# Patient Record
Sex: FEMALE | Race: WHITE
[De-identification: names, ages, dates, MRNs, and addresses within clinical notes are randomized per-mention and may not be internally consistent; named-entity substitution may affect disease eponyms.]

---

## 2017-02-22 ENCOUNTER — HOSPITAL ENCOUNTER (EMERGENCY)
Dept: HOSPITAL 25 - ED | Age: 27
Discharge: LEFT BEFORE DISCHARGE | End: 2017-02-22
Payer: SELF-PAY

## 2017-02-22 VITALS — SYSTOLIC BLOOD PRESSURE: 129 MMHG | DIASTOLIC BLOOD PRESSURE: 61 MMHG

## 2017-02-22 DIAGNOSIS — Z53.21: ICD-10-CM

## 2017-02-22 DIAGNOSIS — R00.2: Primary | ICD-10-CM

## 2017-02-22 PROCEDURE — 93005 ELECTROCARDIOGRAM TRACING: CPT

## 2017-06-24 ENCOUNTER — HOSPITAL ENCOUNTER (EMERGENCY)
Dept: HOSPITAL 25 - UCEAST | Age: 27
Discharge: HOME | End: 2017-06-24
Payer: COMMERCIAL

## 2017-06-24 VITALS — DIASTOLIC BLOOD PRESSURE: 66 MMHG | SYSTOLIC BLOOD PRESSURE: 110 MMHG

## 2017-06-24 DIAGNOSIS — F12.90: ICD-10-CM

## 2017-06-24 DIAGNOSIS — K29.70: Primary | ICD-10-CM

## 2017-06-24 DIAGNOSIS — Z87.891: ICD-10-CM

## 2017-06-24 PROCEDURE — 99212 OFFICE O/P EST SF 10 MIN: CPT

## 2017-06-24 PROCEDURE — G0463 HOSPITAL OUTPT CLINIC VISIT: HCPCS

## 2017-06-24 NOTE — UC
Abdominal Pain Female HPI





- HPI Summary


HPI Summary: 


PT HAS HAD ABDOMINAL PAIN AND INTERMITTENT N/V/D FOR YEARS. USED TO TAKE 

PRILOSEC WHICH HELPED BUT RAN OUT A MONTH AGO. PAIN HAS BEEN CONSTANT FOR THE 

PAST WEEK. PT IS TEARFUL. WENT TO GI SEVERAL YEARS AGO AND NOTHING WAS FOUND. 

NO FEVER.





- History of Current Complaint


Chief Complaint: UCAbdominalPain


Stated Complaint: ABDOMINAL PAIN


Time Seen by Provider: 06/24/17 18:48


Hx Obtained From: Patient


Hx Last Menstrual Period: 6/13/17


Onset/Duration: Gradual Onset, Lasting Weeks, Still Present


Timing: Constant


Severity Initially: Moderate


Severity Currently: Moderate


Pain Intensity: 7


Pain Scale Used: 0-10 Numeric


Location: Diffuse


Radiates: No


Character: Burning


Aggravating Factor(s): Food


Alleviating Factor(s): Nothing


Associated Signs and Symptoms: Positive: Nausea, Vomiting, Diarrhea


Allergies/Adverse Reactions: 


 Allergies











Allergy/AdvReac Type Severity Reaction Status Date / Time


 


No Known Allergies Allergy   Verified 06/24/17 18:21











Home Medications: 


 Home Medications





Calcium Carbonate CHEW TAB* [Tums*]  PRN 06/24/17 [History]











PMH/Surg Hx/FS Hx/Imm Hx





- Additional Past Medical History


Additional PMH: 


EATING DISORDER - RECOVERED 





- Surgical History


Surgical History: Yes


Surgery Procedure, Year, and Place: wisdom teeth





- Family History


Known Family History: Positive: Hypertension





- Social History


Alcohol Use: Rare


Substance Use Type: Marijuana


Substance Use Comment - Amount & Last Used: regularly


Smoking Status (MU): Former Smoker


Amount Used/How Often: 3/4 ppd


Length of Time of Smoking/Using Tobacco: 10 years


Have You Smoked in the Last Year: Yes


Household Exposure Type: Cigarettes





Review of Systems


Constitutional: Negative


Respiratory: Negative


Cardiovascular: Negative


Gastrointestinal: Abdominal Pain, Vomiting, Diarrhea, Nausea


All Other Systems Reviewed And Are Negative: Yes





Physical Exam


Triage Information Reviewed: Yes


Appearance: Well-Appearing, No Pain Distress, Well-Nourished, Other: - PT 

TEARFUL


Vital Signs: 


 Initial Vital Signs











Temp  97.7 F   06/24/17 18:16


 


Pulse  79   06/24/17 18:16


 


Resp  16   06/24/17 18:16


 


BP  110/66   06/24/17 18:16


 


Pulse Ox  100   06/24/17 18:16











Vital Signs Reviewed: Yes


Eyes: Positive: Conjunctiva Clear


ENT: Positive: Hearing grossly normal


Neck: Positive: Supple


Respiratory Exam: Normal


Cardiovascular Exam: Normal


Abdomen Description: Positive: Soft, Other: - DIFFUSELY TENDER. NO REBOUND OR 

RIGIDITY.  Negative: CVA Tenderness (R), CVA Tenderness (L), Distended, Guarding


Bowel Sounds: Positive: Present


Musculoskeletal: Positive: No Edema


Neurological: Positive: Alert


Psychological: Positive: Age Appropriate Behavior


Skin: Negative: rashes





Re-Evaluation





- Re-Evaluation


  ** First Eval


Re-Evaluation Time: 19:50 - PT FEELS BETTER AFTE GI COCKTAIL AND OMEPRAZOLE


Change: Improved





Abd Pain Female Course/Dx





- Course


Course Of Treatment: SX MAY BE DUE TO IBS VS. GASTRITIS VS. REFLUX VS. ANXIETY. 

REFERRED TO GI FOR FURTHER EVALUATION. PPI DAILY. WATCH DIET.





- Differential Dx/Diagnosis


Provider Diagnoses: GASTRITIS





Discharge





- Discharge Plan


Condition: Stable


Disposition: HOME


Prescriptions: 


Omeprazole 40 mg PO DAILY #30 cap


Patient Education Materials:  Gastritis (ED), Abdominal Pain (ED)


Forms:  *Work Release


Referrals: 


Bill Nation, NP [Primary Care Provider] - If Needed


Additional Instructions: 


TAKE THE REFLUX MEDICINE IN THE MORNING (IDEALLY AT LEAST 30 MINUTES BEFORE YOU 

EAT). EAT SLOWLY. STAY UPRIGHT AT LEAST 30 MINUTES AFTER EATING. EAT SMALLER, 

MORE FREQUENT MEALS AS OPPOSED TO LARGE INFREQUENT MEALS. AVOID POSSIBLE 

TRIGGER FOODS - GREASY, SPICY, ACIDIC FOODS. CAFFEINE, ALCOHOL.





FOLLOW-UP WITH GI





[facility]


Address: [address] [place] [address]


[phone]





CALL THE NUMBER BELOW FOR ASSISTANCE IN ESTABLISHING WITH A PCP


An additional resource available to assist in finding the appropriate physician 

for your health care needs is [facility] ([facility]).  

You may contact them by calling [phone].

## 2018-06-02 ENCOUNTER — HOSPITAL ENCOUNTER (EMERGENCY)
Dept: HOSPITAL 25 - ED | Age: 28
Discharge: HOME | End: 2018-06-02
Payer: COMMERCIAL

## 2018-06-02 VITALS — DIASTOLIC BLOOD PRESSURE: 65 MMHG | SYSTOLIC BLOOD PRESSURE: 120 MMHG

## 2018-06-02 DIAGNOSIS — Z87.891: ICD-10-CM

## 2018-06-02 DIAGNOSIS — N83.201: Primary | ICD-10-CM

## 2018-06-02 LAB
BASOPHILS # BLD AUTO: 0.1 10^3/UL (ref 0–0.2)
EOSINOPHIL # BLD AUTO: 0 10^3/UL (ref 0–0.6)
HCT VFR BLD AUTO: 40 % (ref 35–47)
HGB BLD-MCNC: 13.7 G/DL (ref 12–16)
LYMPHOCYTES # BLD AUTO: 3.7 10^3/UL (ref 1–4.8)
MCH RBC QN AUTO: 29 PG (ref 27–31)
MCHC RBC AUTO-ENTMCNC: 34 G/DL (ref 31–36)
MCV RBC AUTO: 85 FL (ref 80–97)
MONOCYTES # BLD AUTO: 0.5 10^3/UL (ref 0–0.8)
NEUTROPHILS # BLD AUTO: 3.9 10^3/UL (ref 1.5–7.7)
NRBC # BLD AUTO: 0 10^3/UL
NRBC BLD QL AUTO: 0.1
PLATELET # BLD AUTO: 231 10^3/UL (ref 150–450)
RBC # BLD AUTO: 4.74 10^6/UL (ref 4–5.4)
WBC # BLD AUTO: 8.3 10^3/UL (ref 3.5–10.8)

## 2018-06-02 PROCEDURE — 99284 EMERGENCY DEPT VISIT MOD MDM: CPT

## 2018-06-02 PROCEDURE — 96376 TX/PRO/DX INJ SAME DRUG ADON: CPT

## 2018-06-02 PROCEDURE — 87491 CHLMYD TRACH DNA AMP PROBE: CPT

## 2018-06-02 PROCEDURE — 87480 CANDIDA DNA DIR PROBE: CPT

## 2018-06-02 PROCEDURE — 81015 MICROSCOPIC EXAM OF URINE: CPT

## 2018-06-02 PROCEDURE — 83605 ASSAY OF LACTIC ACID: CPT

## 2018-06-02 PROCEDURE — 74177 CT ABD & PELVIS W/CONTRAST: CPT

## 2018-06-02 PROCEDURE — 36415 COLL VENOUS BLD VENIPUNCTURE: CPT

## 2018-06-02 PROCEDURE — 83690 ASSAY OF LIPASE: CPT

## 2018-06-02 PROCEDURE — 96372 THER/PROPH/DIAG INJ SC/IM: CPT

## 2018-06-02 PROCEDURE — 87591 N.GONORRHOEAE DNA AMP PROB: CPT

## 2018-06-02 PROCEDURE — 87086 URINE CULTURE/COLONY COUNT: CPT

## 2018-06-02 PROCEDURE — 81003 URINALYSIS AUTO W/O SCOPE: CPT

## 2018-06-02 PROCEDURE — 87510 GARDNER VAG DNA DIR PROBE: CPT

## 2018-06-02 PROCEDURE — 87661 TRICHOMONAS VAGINALIS AMPLIF: CPT

## 2018-06-02 PROCEDURE — 86140 C-REACTIVE PROTEIN: CPT

## 2018-06-02 PROCEDURE — 84702 CHORIONIC GONADOTROPIN TEST: CPT

## 2018-06-02 PROCEDURE — 96361 HYDRATE IV INFUSION ADD-ON: CPT

## 2018-06-02 PROCEDURE — 96374 THER/PROPH/DIAG INJ IV PUSH: CPT

## 2018-06-02 PROCEDURE — 80053 COMPREHEN METABOLIC PANEL: CPT

## 2018-06-02 PROCEDURE — 85025 COMPLETE CBC W/AUTO DIFF WBC: CPT

## 2018-06-02 NOTE — RAD
INDICATION: RIGHT lower quadrant pain, nausea, vomiting.



COMPARISON: August 25, 2016



TECHNIQUE: Multidetector CT images were obtained from the lung bases to the ischial

tuberosities with 81 mL Omnipaque 300 IV and oral contrast.  Multiplanar reformation.



REPORT: Minimal dependent atelectasis at the lung bases.



Relative hypodensity of the liver likely reflects early stage of contrast enhancement.

Suggestion of variant Riedel lobe accounting for mild cephalocaudal prominence of the

liver without change. No suspicious abnormality of the liver. No CT abnormality of the

gallbladder, pancreas, spleen. Small splenule at the splenic hilum.



Negative for CT abnormality of the upper GI, small bowel, medially extending appendix, or

colon. Trace physiologic range volume of free fluid in the cul-de-sac. Negative for free

air or hernias.



Normal adrenal glands. Unremarkable kidneys with symmetric nephrograms and pyelograms.

Unremarkable nondilated ureters and urinary bladder. Unremarkable anteverted uterus and

LEFT adnexal region. 6.9 x 5.0 x 4.7 cm well-circumscribed RIGHT ovarian or paraovarian

lesion measures mildly increased in density relative to water and demonstrates interval

increase in size compared to thousand 16 exam.



Negative for lymphadenopathy. Physiologic distention of the IVC.



Negative for suspicious osseous lesions. Unchanged 17 degrees Clay angle dextroscoliosis

measured between T12 and L5.



IMPRESSION: 

1. Normal appendix documented.

2. Interval enlargement of 6.9 x 5.0 x 4.7 cm denser than water lesion of the RIGHT ovary

or paraovarian region compared with the August 25, 2016 CT. Correlate with clinical

assessment and consider pelvic ultrasound for further evaluation.

## 2018-06-02 NOTE — ED
Abdominal Pain/Female





<Wilton Reynolds - Last Filed: 06/02/18 04:51>





- HPI Summary


HPI Summary: 





Complains of RLQ pain, N/V/D, clamminess, increased urinary frequency starting 

today.  Just finished menses, states unusual vaginal discharge with menses. 

History of intermittent N/V, ovarian cysts, occasional diarrhea.  Abdominal 

pain described as sharp, started as intermittent but has become constant, worse 

with movement/coughing.  Denies cough, sore throat, CP, SOB, vaginal bleeding 

or pain, pain with urination.  Medical history is ovarian cysts, intermittent N/

V, occasional diarrhea.  Abdominal/pelvic surgical history is none.  2 prior 

CTs ab/pel here in past 2 years for right lower quadrant pain negative for 

appendicitis





- History of Current Complaint


Hx Obtained From: Patient, Family/Caretaker


Hx Last Menstrual Period: 6/13/17


Pain Intensity: 6





<Matt Lopez - Last Filed: 06/03/18 02:31>





- History of Current Complaint


Chief Complaint: EDAbdPain


Stated Complaint: ABD PAIN


Time Seen by Provider: 06/02/18 01:40


Allergies/Adverse Reactions: 


 Allergies











Allergy/AdvReac Type Severity Reaction Status Date / Time


 


No Known Allergies Allergy   Verified 06/24/17 18:21














PMH/Surg Hx/FS Hx/Imm Hx


Endocrine/Hematology History: 


   Denies: Hx Diabetes, Hx Thyroid Disease


Cardiovascular History: 


   Denies: Hx Congestive Heart Failure, Hx Hypertension


Respiratory History: Reports: Hx Asthma - On meds


   Denies: Hx Chronic Obstructive Pulmonary Disease (COPD)


GI History: 


   Denies: Hx Ulcer


 History: 


   Denies: Hx Dialysis, Hx Renal Disease


Psychiatric History: Reports: Hx Anxiety





- Surgical History


Surgery Procedure, Year, and Place: wisdom teeth





- Immunization History


Date of Tetanus Vaccine: UTD


Date of Influenza Vaccine: NONE


Infectious Disease History: No


Infectious Disease History: 


   Denies: Hx Clostridium Difficile, Hx Hepatitis, Hx Human Immunodeficiency 

Virus (HIV), Hx of Known/Suspected MRSA, Hx Shingles, Hx Tuberculosis, Traveled 

Outside the [place] in Last 30 Days





- Family History


Known Family History: Positive: None, Hypertension





- Social History


Alcohol Use: Rare


Substance Use Type: Reports: Marijuana


Substance Use Comment - Amount & Last Used: regularly


Smoking Status (MU): Former Smoker


Amount Used/How Often: 3/4 ppd


Length of Time of Smoking/Using Tobacco: 10 years


Have You Smoked in the Last Year: Yes





<Matt Lopez - Last Filed: 06/03/18 02:31>





Review of Systems


Positive: Other - clammy


Eyes: Negative


ENT: Negative


Cardiovascular: Negative


Respiratory: Negative


Positive: Abdominal Pain, Vomiting, Diarrhea, Nausea


Positive: frequency


Musculoskeletal: Negative


Skin: Negative


Neurological: Negative


Psychological: Normal


All Other Systems Reviewed And Are Negative: Yes





<Matt Lopez - Last Filed: 06/03/18 02:31>





Physical Exam


Vital Signs On Initial Exam: 


 Initial Vitals











Temp Pulse Resp BP Pulse Ox


 


 36.8 C   89   16   127/77   99 


 


 06/02/18 01:22  06/02/18 01:22  06/02/18 01:22  06/02/18 01:22  06/02/18 01:22














<RodolfoWilton - Last Filed: 06/02/18 04:51>





- Summary


Physical Exam Summary: 





Tender to palpation along the lower abdomen with right lower quadrant being the 

most tender.


Triage Information Reviewed: Yes


Vital Signs On Initial Exam: 


 Initial Vitals











Temp Pulse Resp BP Pulse Ox


 


 98.2 F   89   16   127/77   99 


 


 06/02/18 01:22  06/02/18 01:22  06/02/18 01:22  06/02/18 01:22  06/02/18 01:22











Vital Signs Reviewed: Yes


Appearance: Positive: Well-Appearing


Skin: Positive: Warm


Head/Face: Positive: Normal Head/Face Inspection


Eyes: Positive: Normal


Neck: Positive: Supple


Respiratory/Lung Sounds: Positive: Clear to Auscultation


Cardiovascular: Positive: Normal


Abdomen Description: Positive: McBurney's Point Tenderness


Pelvic Exam: Positive: External Exam Normal, No Cerv. Motion Tender, Tender 

Adnexa - rt side tenderness.  Negative: No Masses, Active Bleeding, Blood, 

Cervicitis, Discharge, Lesions, Mass, Tender w/ Cervical Motion, Tender Uterus, 

Ulcers


Musculoskeletal: Positive: Normal


Neurological: Positive: Normal


Psychiatric: Positive: Normal


AVPU Assessment: Alert





- Judy Coma Scale


Best Eye Response: 4 - Spontaneous


Best Motor Response: 6 - Obeys Commands


Best Verbal Response: 5 - Oriented


Coma Scale Total: 15





<Matt Lopez - Last Filed: 06/03/18 02:31>





Diagnostics





- Vital Signs


 Vital Signs











  Temp Pulse Resp BP Pulse Ox


 


 06/02/18 04:33   52   100/73  96


 


 06/02/18 04:10    16  


 


 06/02/18 04:09   58   107/59  99


 


 06/02/18 04:00   52    98


 


 06/02/18 03:10   49   108/61  98


 


 06/02/18 03:00   74    100


 


 06/02/18 02:33   68   104/68  99


 


 06/02/18 02:28    16  


 


 06/02/18 01:22  36.8 C  89  16  127/77  99














- Laboratory


Lab Results: 


 Lab Results











  06/02/18 06/02/18 06/02/18 Range/Units





  03:11 03:11 03:11 


 


WBC  8.3    (3.5-10.8)  10^3/ul


 


RBC  4.74    (4.0-5.4)  10^6/ul


 


Hgb  13.7    (12.0-16.0)  g/dl


 


Hct  40    (35-47)  %


 


MCV  85    (80-97)  fL


 


MCH  29    (27-31)  pg


 


MCHC  34    (31-36)  g/dl


 


RDW  13    (10.5-15)  %


 


Plt Count  231    (150-450)  10^3/ul


 


MPV  8.5    (7.4-10.4)  um3


 


Neut % (Auto)  47.6    (38-83)  %


 


Lymph % (Auto)  44.7    (25-47)  %


 


Mono % (Auto)  6.1    (0-7)  %


 


Eos % (Auto)  0.6    (0-6)  %


 


Baso % (Auto)  1.0    (0-2)  %


 


Absolute Neuts (auto)  3.9    (1.5-7.7)  10^3/ul


 


Absolute Lymphs (auto)  3.7    (1.0-4.8)  10^3/ul


 


Absolute Monos (auto)  0.5    (0-0.8)  10^3/ul


 


Absolute Eos (auto)  0    (0-0.6)  10^3/ul


 


Absolute Basos (auto)  0.1    (0-0.2)  10^3/ul


 


Absolute Nucleated RBC  0    10^3/ul


 


Nucleated RBC %  0.1    


 


Sodium    140  (139-145)  mmol/L


 


Potassium    3.3 L  (3.5-5.0)  mmol/L


 


Chloride    104  (101-111)  mmol/L


 


Carbon Dioxide    27  (22-32)  mmol/L


 


Anion Gap    9  (2-11)  mmol/L


 


BUN    9  (6-24)  mg/dL


 


Creatinine    0.69  (0.51-0.95)  mg/dL


 


Est GFR ( Amer)    133.3  (>60)  


 


Est GFR (Non-Af Amer)    103.7  (>60)  


 


BUN/Creatinine Ratio    13.0  (8-20)  


 


Glucose    83  ()  mg/dL


 


Lactic Acid     (0.5-2.0)  mmol/L


 


Calcium    9.4  (8.6-10.3)  mg/dL


 


Total Bilirubin    0.40  (0.2-1.0)  mg/dL


 


AST    11 L  (13-39)  U/L


 


ALT    8  (7-52)  U/L


 


Alkaline Phosphatase    43  ()  U/L


 


C-Reactive Protein    < 1.00  (< 5.00)  mg/L


 


Total Protein    6.5  (6.4-8.9)  g/dL


 


Albumin    4.1  (3.2-5.2)  g/dL


 


Globulin    2.4  (2-4)  g/dL


 


Albumin/Globulin Ratio    1.7  (1-3)  


 


Lipase    18  (11.0-82.0)  U/L


 


Beta HCG, Quant    < 0.60  mIU/mL


 


Urine Color   Yellow   


 


Urine Appearance   Cloudy   


 


Urine pH   6.0   (5-9)  


 


Ur Specific Gravity   1.015   (1.010-1.030)  


 


Urine Protein   Negative   (Negative)  


 


Urine Ketones   Negative   (Negative)  


 


Urine Blood   2+ A   (Negative)  


 


Urine Nitrate   Negative   (Negative)  


 


Urine Bilirubin   Negative   (Negative)  


 


Urine Urobilinogen   Negative   (Negative)  


 


Ur Leukocyte Esterase   Trace A   (Negative)  


 


Urine WBC (Auto)   1+(6-10/hpf) A   (Absent)  


 


Urine RBC (Auto)   1+(3-5/hpf) A   (Absent)  


 


Ur Squamous Epith Cells   Present A   (Absent)  


 


Urine Bacteria   Absent   (Absent)  


 


Urine Glucose   Negative   (Negative)  














  06/02/18 Range/Units





  03:11 


 


WBC   (3.5-10.8)  10^3/ul


 


RBC   (4.0-5.4)  10^6/ul


 


Hgb   (12.0-16.0)  g/dl


 


Hct   (35-47)  %


 


MCV   (80-97)  fL


 


MCH   (27-31)  pg


 


MCHC   (31-36)  g/dl


 


RDW   (10.5-15)  %


 


Plt Count   (150-450)  10^3/ul


 


MPV   (7.4-10.4)  um3


 


Neut % (Auto)   (38-83)  %


 


Lymph % (Auto)   (25-47)  %


 


Mono % (Auto)   (0-7)  %


 


Eos % (Auto)   (0-6)  %


 


Baso % (Auto)   (0-2)  %


 


Absolute Neuts (auto)   (1.5-7.7)  10^3/ul


 


Absolute Lymphs (auto)   (1.0-4.8)  10^3/ul


 


Absolute Monos (auto)   (0-0.8)  10^3/ul


 


Absolute Eos (auto)   (0-0.6)  10^3/ul


 


Absolute Basos (auto)   (0-0.2)  10^3/ul


 


Absolute Nucleated RBC   10^3/ul


 


Nucleated RBC %   


 


Sodium   (139-145)  mmol/L


 


Potassium   (3.5-5.0)  mmol/L


 


Chloride   (101-111)  mmol/L


 


Carbon Dioxide   (22-32)  mmol/L


 


Anion Gap   (2-11)  mmol/L


 


BUN   (6-24)  mg/dL


 


Creatinine   (0.51-0.95)  mg/dL


 


Est GFR ( Amer)   (>60)  


 


Est GFR (Non-Af Amer)   (>60)  


 


BUN/Creatinine Ratio   (8-20)  


 


Glucose   ()  mg/dL


 


Lactic Acid  1.1  (0.5-2.0)  mmol/L


 


Calcium   (8.6-10.3)  mg/dL


 


Total Bilirubin   (0.2-1.0)  mg/dL


 


AST   (13-39)  U/L


 


ALT   (7-52)  U/L


 


Alkaline Phosphatase   ()  U/L


 


C-Reactive Protein   (< 5.00)  mg/L


 


Total Protein   (6.4-8.9)  g/dL


 


Albumin   (3.2-5.2)  g/dL


 


Globulin   (2-4)  g/dL


 


Albumin/Globulin Ratio   (1-3)  


 


Lipase   (11.0-82.0)  U/L


 


Beta HCG, Quant   mIU/mL


 


Urine Color   


 


Urine Appearance   


 


Urine pH   (5-9)  


 


Ur Specific Gravity   (1.010-1.030)  


 


Urine Protein   (Negative)  


 


Urine Ketones   (Negative)  


 


Urine Blood   (Negative)  


 


Urine Nitrate   (Negative)  


 


Urine Bilirubin   (Negative)  


 


Urine Urobilinogen   (Negative)  


 


Ur Leukocyte Esterase   (Negative)  


 


Urine WBC (Auto)   (Absent)  


 


Urine RBC (Auto)   (Absent)  


 


Ur Squamous Epith Cells   (Absent)  


 


Urine Bacteria   (Absent)  


 


Urine Glucose   (Negative)  











Result Diagrams: 


 06/02/18 03:11





 06/02/18 03:11


Lab Statement: Any lab studies that have been ordered have been reviewed, and 

results considered in the medical decision making process.





<Wilton Reynolds - Last Filed: 06/02/18 04:51>





- Vital Signs


 Vital Signs











  Temp Pulse Resp BP Pulse Ox


 


 06/02/18 01:22  98.2 F  89  16  127/77  99














- Laboratory


Result Diagrams: 


 06/02/18 03:11





 06/02/18 03:11


Lab Statement: Any lab studies that have been ordered have been reviewed, and 

results considered in the medical decision making process.





- CT


  ** ab/pel


CT Interpretation: Positive (See Comments) - right ovarian cyst.  Appendix 

negative


CT Interpretation Completed By: Radiologist





<Matt Lopez - Last Filed: 06/03/18 02:31>





Abdominal Pain Fem Course/Dx





<Wilton Reynolds - Last Filed: 06/02/18 04:51>





- Course


Course Of Treatment: Complains of RLQ pain, N/V/D, clamminess, increased 

urinary frequency starting today.  Just finished menses, states unusual vaginal 

discharge with menses. History of intermittent N/V, ovarian cysts, occasional 

diarrhea.  Abdominal pain described as sharp, started as intermittent but has 

become constant, worse with movement/coughing.  Denies cough, sore throat, CP, 

SOB, vaginal bleeding or pain, pain with urination.  Medical history is ovarian 

cysts, intermittent N/V, occasional diarrhea.  Abdominal/pelvic surgical 

history is none.  2 prior CTs ab/pel here in past 2 years for right lower 

quadrant pain negative for appendicitis.  Tender to palpation along the lower 

abdomen with right lower quadrant being the most tender.  Pelvic exam 

remarkable only for right adnexa tenderness.  No CMT.  Labs unremarkable.  

Imaging remarkable for ovarian cyst.  Vital signs within normal limits.  Rx for 

naproxen





<Matt Lopez - Last Filed: 06/03/18 02:31>





- Diagnoses


Provider Diagnoses: 


 Ovarian cyst








Discharge





<Wilton Reynolds - Last Filed: 06/02/18 04:51>





- Sign-Out/Discharge


Documenting (check all that apply): Sign-Out Patient


Signing out patient TO: Wilton Reynolds - 03:15





- Billing Disposition and Condition


Condition: GOOD


Disposition: HOME





<Matt Lopez - Last Filed: 06/03/18 02:31>





- Discharge Plan


Condition: Good


Disposition: HOME


Prescriptions: 


Naproxen [Naprosyn 500 mg tab] 500 mg PO BID PRN #15 tablet


 PRN Reason: Pain


Patient Education Materials:  Ovarian Cyst (ED)


Referrals: 


Bill Nation NP [Primary Care Provider] -

## 2019-01-09 ENCOUNTER — HOSPITAL ENCOUNTER (EMERGENCY)
Dept: HOSPITAL 25 - ED | Age: 29
Discharge: HOME | End: 2019-01-09
Payer: COMMERCIAL

## 2019-01-09 VITALS — SYSTOLIC BLOOD PRESSURE: 110 MMHG | DIASTOLIC BLOOD PRESSURE: 84 MMHG

## 2019-01-09 DIAGNOSIS — N73.9: Primary | ICD-10-CM

## 2019-01-09 DIAGNOSIS — F41.9: ICD-10-CM

## 2019-01-09 DIAGNOSIS — J45.909: ICD-10-CM

## 2019-01-09 DIAGNOSIS — Z87.891: ICD-10-CM

## 2019-01-09 LAB
ALBUMIN SERPL BCG-MCNC: 4.5 G/DL (ref 3.2–5.2)
ALBUMIN/GLOB SERPL: 2 {RATIO} (ref 1–3)
ALP SERPL-CCNC: 42 U/L (ref 34–104)
ALT SERPL W P-5'-P-CCNC: 8 U/L (ref 7–52)
ANION GAP SERPL CALC-SCNC: 6 MMOL/L (ref 2–11)
AST SERPL-CCNC: 12 U/L (ref 13–39)
BASOPHILS # BLD AUTO: 0.1 10^3/UL (ref 0–0.2)
BUN SERPL-MCNC: 10 MG/DL (ref 6–24)
BUN/CREAT SERPL: 13.9 (ref 8–20)
CALCIUM SERPL-MCNC: 9.7 MG/DL (ref 8.6–10.3)
CHLORIDE SERPL-SCNC: 105 MMOL/L (ref 101–111)
EOSINOPHIL # BLD AUTO: 0 10^3/UL (ref 0–0.6)
GLOBULIN SER CALC-MCNC: 2.2 G/DL (ref 2–4)
GLUCOSE SERPL-MCNC: 99 MG/DL (ref 70–100)
HCG SERPL QL: < 0.6 MIU/ML
HCO3 SERPL-SCNC: 28 MMOL/L (ref 22–32)
HCT VFR BLD AUTO: 43 % (ref 35–47)
HGB BLD-MCNC: 14.8 G/DL (ref 12–16)
LYMPHOCYTES # BLD AUTO: 2.4 10^3/UL (ref 1–4.8)
MCH RBC QN AUTO: 29 PG (ref 27–31)
MCHC RBC AUTO-ENTMCNC: 34 G/DL (ref 31–36)
MCV RBC AUTO: 86 FL (ref 80–97)
MONOCYTES # BLD AUTO: 0.4 10^3/UL (ref 0–0.8)
NEUTROPHILS # BLD AUTO: 4.5 10^3/UL (ref 1.5–7.7)
NRBC # BLD AUTO: 0 10^3/UL
NRBC BLD QL AUTO: 0
PLATELET # BLD AUTO: 282 10^3/UL (ref 150–450)
POTASSIUM SERPL-SCNC: 3.9 MMOL/L (ref 3.5–5)
PROT SERPL-MCNC: 6.7 G/DL (ref 6.4–8.9)
RBC # BLD AUTO: 5.03 10^6/UL (ref 4–5.4)
SODIUM SERPL-SCNC: 139 MMOL/L (ref 135–145)
WBC # BLD AUTO: 7.4 10^3/UL (ref 3.5–10.8)
WBC UR QL AUTO: (no result)

## 2019-01-09 PROCEDURE — 76830 TRANSVAGINAL US NON-OB: CPT

## 2019-01-09 PROCEDURE — 36415 COLL VENOUS BLD VENIPUNCTURE: CPT

## 2019-01-09 PROCEDURE — 74176 CT ABD & PELVIS W/O CONTRAST: CPT

## 2019-01-09 PROCEDURE — 81015 MICROSCOPIC EXAM OF URINE: CPT

## 2019-01-09 PROCEDURE — 96375 TX/PRO/DX INJ NEW DRUG ADDON: CPT

## 2019-01-09 PROCEDURE — 83690 ASSAY OF LIPASE: CPT

## 2019-01-09 PROCEDURE — 96374 THER/PROPH/DIAG INJ IV PUSH: CPT

## 2019-01-09 PROCEDURE — 85025 COMPLETE CBC W/AUTO DIFF WBC: CPT

## 2019-01-09 PROCEDURE — 83605 ASSAY OF LACTIC ACID: CPT

## 2019-01-09 PROCEDURE — 81003 URINALYSIS AUTO W/O SCOPE: CPT

## 2019-01-09 PROCEDURE — 86140 C-REACTIVE PROTEIN: CPT

## 2019-01-09 PROCEDURE — 87591 N.GONORRHOEAE DNA AMP PROB: CPT

## 2019-01-09 PROCEDURE — 87491 CHLMYD TRACH DNA AMP PROBE: CPT

## 2019-01-09 PROCEDURE — 96361 HYDRATE IV INFUSION ADD-ON: CPT

## 2019-01-09 PROCEDURE — 80053 COMPREHEN METABOLIC PANEL: CPT

## 2019-01-09 PROCEDURE — 87086 URINE CULTURE/COLONY COUNT: CPT

## 2019-01-09 PROCEDURE — 99282 EMERGENCY DEPT VISIT SF MDM: CPT

## 2019-01-09 PROCEDURE — 84702 CHORIONIC GONADOTROPIN TEST: CPT

## 2019-01-09 NOTE — ED
Abdominal Pain/Female





- HPI Summary


HPI Summary: 





A 27 y/o female presents to [facility] with a chief complaint of constant stabbing 

constant LLQ abdominal pain since 01/08/19. However she claims that she has had 

intermittent pain for 3 weeks PTA. She rates her pain as an 8/10. She has a Hx 

of ovarian cysts. She admits to some dysuria, fevers, shaking and chills. 

However, she did not measure her fever with a thermometer. She reports not 

being sexually active in months. [facility]. She denies a Hx of kidney stones.  She denies 

smoking or EtOH use. She denies an abdominal SHx.














- History of Current Complaint


Chief Complaint: EDAbdPain


Stated Complaint: ABD PAIN


Time Seen by Provider: 01/09/19 17:06


Hx Obtained From: Patient


Hx Last Menstrual Period: 6/13/17


Onset/Duration: Gradual Onset, Lasting Weeks, Still Present


Timing: Hours


Severity Initially: Severe


Severity Currently: Severe


Pain Intensity: 8


Pain Scale Used: 0-10 Numeric


Location: Discrete At: LLQ


Radiates: No


Character: Other: - stabbing


Aggravating Factor(s): Nothing


Alleviating Factor(s): Nothing


Associated Signs and Symptoms: Positive: Fever, Urinary Symptoms - dysuria.  

Negative: Back Pain


Allergies/Adverse Reactions: 


 Allergies











Allergy/AdvReac Type Severity Reaction Status Date / Time


 


No Known Allergies Allergy   Verified 06/24/17 18:21














PMH/Surg Hx/FS Hx/Imm Hx


Endocrine/Hematology History: 


   Denies: Hx Diabetes, Hx Thyroid Disease


Cardiovascular History: 


   Denies: Hx Congestive Heart Failure, Hx Hypertension


Respiratory History: Reports: Hx Asthma - On meds


   Denies: Hx Chronic Obstructive Pulmonary Disease (COPD)


GI History: 


   Denies: Hx Ulcer


 History: 


   Denies: Hx Dialysis, Hx Kidney Stones, Hx Renal Disease


Psychiatric History: Reports: Hx Anxiety





- Surgical History


Surgery Procedure, Year, and Place: wisdom teeth





- Immunization History


Date of Tetanus Vaccine: UTD


Date of Influenza Vaccine: NONE


Infectious Disease History: No


Infectious Disease History: 


   Denies: Hx Clostridium Difficile, Hx Hepatitis, Hx Human Immunodeficiency 

Virus (HIV), Hx of Known/Suspected MRSA, Hx Shingles, Hx Tuberculosis, Traveled 

Outside the [place] in Last 30 Days





- Family History


Known Family History: Positive: Hypertension





- Social History


Alcohol Use: None


Hx Substance Use: Yes


Substance Use Type: Reports: Marijuana


Substance Use Comment - Amount & Last Used: regularly


Smoking Status (MU): Former Smoker


Amount Used/How Often: 3/4 ppd


Length of Time of Smoking/Using Tobacco: 10 years


Have You Smoked in the Last Year: Yes





Review of Systems


Positive: Fever, Chills, Other - Positive: shaking


Positive: Abdominal Pain


Positive: dysuria


All Other Systems Reviewed And Are Negative: Yes





Physical Exam





- Summary


Physical Exam Summary: 





Appearance: Well appearing, no pain distress


Skin: warm, dry, reflects adequate perfusion


Head/face: normal


Eyes: EOMI, MICHAEL


ENT: mucous membranes moist


Neck: supple, non-tender


Respiratory: CTA, breath sounds present


Cardiovascular: RRR, pulses symmetrical 


Abdomen: No CVA tenderness, mild LLQ tenderness, soft


Bowel Sounds: present


Musculoskeletal: normal, strength/ROM intact


Neuro: normal, sensory motor intact, A&Ox3


Pelvic exam: Yellow discharge at the cervical's.  Positive cervical motion 

tenderness.  Moderate left adnexal tenderness without mass


Triage Information Reviewed: Yes


Vital Signs On Initial Exam: 


 Initial Vitals











Temp Pulse Resp BP Pulse Ox


 


 100.0 F   89   22   127/65   100 


 


 01/09/19 14:48  01/09/19 14:48  01/09/19 14:48  01/09/19 14:48  01/09/19 14:48











Vital Signs Reviewed: Yes





Diagnostics





- Vital Signs


 Vital Signs











  Temp Pulse Resp BP Pulse Ox


 


 01/09/19 14:48  100.0 F  89  22  127/65  100














- Laboratory


Lab Results: 


 Lab Results











  01/09/19 01/09/19 01/09/19 Range/Units





  15:32 15:32 15:32 


 


WBC  7.4    (3.5-10.8)  10^3/ul


 


RBC  5.03    (4.00-5.40)  10^6/ul


 


Hgb  14.8    (12.0-16.0)  g/dl


 


Hct  43    (35-47)  %


 


MCV  86    (80-97)  fL


 


MCH  29    (27-31)  pg


 


MCHC  34    (31-36)  g/dl


 


RDW  13    (10.5-15)  %


 


Plt Count  282    (150-450)  10^3/ul


 


MPV  7.8    (7.4-10.4)  fL


 


Neut % (Auto)  61.0    %


 


Lymph % (Auto)  32.3    %


 


Mono % (Auto)  5.5    %


 


Eos % (Auto)  0.3    %


 


Baso % (Auto)  0.9    %


 


Absolute Neuts (auto)  4.5    (1.5-7.7)  10^3/ul


 


Absolute Lymphs (auto)  2.4    (1.0-4.8)  10^3/ul


 


Absolute Monos (auto)  0.4    (0-0.8)  10^3/ul


 


Absolute Eos (auto)  0    (0-0.6)  10^3/ul


 


Absolute Basos (auto)  0.1    (0-0.2)  10^3/ul


 


Absolute Nucleated RBC  0    10^3/ul


 


Nucleated RBC %  0    


 


Sodium   139   (135-145)  mmol/L


 


Potassium   3.9   (3.5-5.0)  mmol/L


 


Chloride   105   (101-111)  mmol/L


 


Carbon Dioxide   28   (22-32)  mmol/L


 


Anion Gap   6   (2-11)  mmol/L


 


BUN   10   (6-24)  mg/dL


 


Creatinine   0.72   (0.51-0.95)  mg/dL


 


Est GFR ( Amer)   118.5   (>60)  


 


Est GFR (Non-Af Amer)   97.9   (>60)  


 


BUN/Creatinine Ratio   13.9   (8-20)  


 


Glucose   99   ()  mg/dL


 


Lactic Acid    0.7  (0.5-2.0)  mmol/L


 


Calcium   9.7   (8.6-10.3)  mg/dL


 


Total Bilirubin   0.40   (0.2-1.0)  mg/dL


 


AST   12 L   (13-39)  U/L


 


ALT   8   (7-52)  U/L


 


Alkaline Phosphatase   42   ()  U/L


 


C-Reactive Protein   1.58   (<8.01)  mg/L


 


Total Protein   6.7   (6.4-8.9)  g/dL


 


Albumin   4.5   (3.2-5.2)  g/dL


 


Globulin   2.2   (2-4)  g/dL


 


Albumin/Globulin Ratio   2.0   (1-3)  


 


Lipase   18   (11.0-82.0)  U/L


 


Beta HCG, Quant   Pending   











Result Diagrams: 


 01/09/19 15:32





 01/09/19 15:32


Lab Statement: Any lab studies that have been ordered have been reviewed, and 

results considered in the medical decision making process.





- CT


  ** abdomen/pelvis


CT Interpretation Completed By: Radiologist


Summary of CT Findings: No obstructive uropathy.  Previously seen large right 

ovarian cyst is resolved. Currently there is a.  trace amount of free fluid 

which could be from a recently ruptured small cyst.  ED provider has reviewed 

this imaging report.





- Ultrasound


  ** No standard instances


Ultrasound Interpretation Completed By: Radiologist


Summary of Ultrasound Findings: TRANSVAGINAL ULTRASOUND IMPRESSION:  SMALL 

AMOUNT OF FREE INTRAPERITONEAL FLUID IN THE CUL-DE-SAC, OTHERWISE.  

UNREMARKABLE STUDY.





Re-Evaluation





- Re-Evaluation


  ** First Eval


Re-Evaluation Time: 18:35


Change: Unchanged


Comment: Discussed results. Yellow discharge around cervical os. Cervical 

motion tenderness. Patient is crying.





Abdominal Pain Fem Course/Dx





- Course


Course Of Treatment: Nurse's notes reviewed.  Ultrasound, CT scan negative for 

pathology.  Urine noninfected.  Negative CBC.  Patient is febrile with cervical 

motion tenderness and small amount of discharge.  Treated his as PID.  Pain 

treated here.  Discussed with OB/GYN who wishes to treat outpatient.  They will 

follow her up in the office on Friday.  Discharged with pain control and 

antibiotics after Rocephin, doxycycline IV here.





- Diagnoses


Differential Diagnosis: Positive: Diverticulitis, Ectopic Pregnancy, Irritable 

Bowel Syndrome, Ovarian Cyst, Pancreatitis, Pelvic Inflammatory Disease, Renal 

Colic, Urinary Tract Infection


Provider Diagnoses: 


 Pelvic inflammatory disease (PID)








- Provider Notifications


Discussed Care Of Patient With: Caio Prather MD


Time Discussed With Above Provider: 19:00


Instructed by Provider To: Other - Reccomends discharge





Discharge





- Sign-Out/Discharge


Documenting (check all that apply): Patient Departure - DC





- Discharge Plan


Condition: Improved


Disposition: HOME


Prescriptions: 


DOXYcycline CAP(*) [DOXYcycline 100MG CAP(*)] 100 mg PO BID #28 cap


Famotidine TAB* [Pepcid 20 MG TAB*] 20 mg PO BID PRN #20 tab


 PRN Reason: stomach discomfort


Hydrocodone/Acetaminophen [Norco 5-325 Tablet] 1 each PO TID PRN #10 tablet MDD 

3


 PRN Reason: more severe pain


Patient Education Materials:  Pelvic Inflammatory Disease (ED)


Forms:  *Work Release


Referrals: 


Kathy Martinez MD [Primary Care Provider] - 


Eloy Prather MD [Medical Doctor] - 


Additional Instructions: 


Call OB/GYN first thing in the morning to be seen by them on Friday.  Return 

with high fever, increased pain, vomiting, worse or other concerns.  Abstain 

from intercourse.





- Billing Disposition and Condition


Condition: IMPROVED


Disposition: Home





- Attestation Statements


Document Initiated by Peggy: Yes


Documenting Scribe: Fercho Murillo


Provider For Whom Peggy is Documenting (Include Credential): Christophe Lamar MD


Scribe Attestation: 


I, Fercho Murillo, scribed for Christophe Lamar MD on 01/09/19 at 1911. 


Scribe Documentation Reviewed: Yes


Provider Attestation: 


The documentation as recorded by the Fercho bates accurately 

reflects the service I personally performed and the decisions made by me, Christophe Lamar MD


Status of Scribe Document: Viewed

## 2019-01-16 ENCOUNTER — HOSPITAL ENCOUNTER (EMERGENCY)
Dept: HOSPITAL 25 - UCEAST | Age: 29
Discharge: HOME | End: 2019-01-16
Payer: MEDICAID

## 2019-01-16 VITALS — SYSTOLIC BLOOD PRESSURE: 115 MMHG | DIASTOLIC BLOOD PRESSURE: 59 MMHG

## 2019-01-16 DIAGNOSIS — R11.0: ICD-10-CM

## 2019-01-16 DIAGNOSIS — Z87.891: ICD-10-CM

## 2019-01-16 DIAGNOSIS — N73.9: ICD-10-CM

## 2019-01-16 DIAGNOSIS — R10.2: Primary | ICD-10-CM

## 2019-01-16 PROCEDURE — 99212 OFFICE O/P EST SF 10 MIN: CPT

## 2019-01-16 PROCEDURE — 81003 URINALYSIS AUTO W/O SCOPE: CPT

## 2019-01-16 PROCEDURE — G0463 HOSPITAL OUTPT CLINIC VISIT: HCPCS

## 2019-01-16 NOTE — XMS REPORT
Continuity of Care Document (C-CDA R2.1) (Encounter date: 2018 03:50 PM)

 Created on:2018



Patient:KALANI

Sex:Female

:1990





Demographics







 Address  [address]



   [place] [address]

 

 Work Phone  [phone]

 

 Mobile Phone  [phone]

 

 Home Phone  [phone]

 

 Email Address  [email or web address]

 

 Preferred Language  und

 

 Marital Status  Not  or 

 

 Amish Affiliation  Unknown

 

 Race  Unknown

 

 Additional Race(s)  Other Race

 

 Ethnic Group  Not  or 









Author







 Organization  [facility]

 

 Address  [address]



   [place], [place] [id number]

 

 Phone  [phone]









Support







 Name  Relationship  Address  Phone

 

 EDY URIARTE  parent  [address]   Unavailable



     [place] [address]  

 

 DINORADAMIAN STONE  Unavailable  Unavailable  [phone]









Care Team Providers







 Name  Role  Phone

 

 Erica Valdez NP  Unavailable  Unavailable









Allergies, Adverse Reactions, Alerts







 Substance  Reaction  Status

 

 No Known Allergies    Active







Medications







 Medication  Instructions  Dosage  Effective Dates  Status  Comments



       (start - stop)    

 

 Tri-VyLibra (28)  1 Tab PO Daily    Dec- -  Active  



 0.18 mg(7)/0.215      Dec-    



 mg(7)/0.25 mg(7)-35          



 mcg tablet          

 

 Mirena 20 mcg/24 hr  Insert IU with     -  No Longer  



 (5 years)  paracervical block    Dec-  Active  



 intrauterine device          







Problems







 Condition  Effective Dates (start -  Clinical Status  Comments



   stop)    

 

 Human immunodeficiency virus [HIV]  Dec- -    



 counseling      

 

 Encounter for initial prescription      



 of contraceptive pills      

 

 Encntr screen for infections w      



 sexl mode of transmiss      

 

 Pelvic and perineal pain      

 

 Encounter for removal of      



 intrauterine contraceptive device      

 

 Encounter for initial prescription      



 of contraceptive pills      

 

 IUC, Surveillance      

 

 STI Screening      

 

 Anemia, Screening      

 

 IUC, Surveillance      

 

 Yeast-vulvovaginal      

 

 Anemia, Screening      

 

 IUC Insertion      

 

 GYN Exam, Routine WWE      

 

 STI Screening      

 

 Family Planning Counseling      

 

 STI Screening      

 

 BCM Other, Surveillance      

 

 LABORATORY EXAM NOS      

 

 Cyst of ovary  Dec- -  Active  

 

 Posttraumatic stress disorder  Oct- -  Active  







Procedures







 Procedure  Date

 

 PREVENTIVE COUNSELING, Under 8 Minutes  Dec-

 

 WET SMEAR  Dec-

 

 ASSAY OF BODY FLUID-PH  Dec-

 

 OFFICE/OUTPATIENT VISIT, NEW  Dec-

 

 BLOOD PRESSURE  Dec-

 

 Height/Weight  Dec-

 

 OTHER Medical Services  Dec-

 

 TRI VYLIBRA Contraceptive pills for bc  Dec-

 

 CHYLMD DNA, AMP PROBE  Dec-

 

 N.GONORRHOEAE, DNA, AMP PROB  Dec-







Results







 Test Name  Date and Time  Measure  Units  Reference Range  Abnormal Flag  
Status  Comments









 Panel Description: C trach DNA XXX Ql PCR  Final  









 Swab CT -  Dec-  Negative      N  Final  Performed



 Vaginal  00:00:00            by:<br/>&emsp;&ensp;CDD



               ([id number])<br/><br/>









 Panel Description: Amplified GC - Vaginal  Final  









 Swab GC -  Dec-  Negative      N  Final  Pregnant:



 Vaginal  00:00:00            No<br/><br/>Performed



               by:<br/>&emsp;&ensp;CDD



               ([id number])<br/><br/>









 Panel Description: Wet Mount  Final  









 Wet Mount  Dec- 16:36:00  NegativeHyphae/Candida:  noBudding        
Final  



     yeast:  noTrich:  noClue cells:  noWBCs:          



      noAmine/Whiff test:  negativepH:  4.0          







Advance Directives







 Directive  Yes / No  Effective Date  File Name









 No information







Encounters







 Encounter  Practice  Location  Reason(s)  Diagnoses  Date  Provider  Providers



 Description      For Visit        Copied on



               Encounter

 

 OFFICE/OUTPA  [facility]  Birth  Human  Dec-2  José Miguel  Referring



 TIENT VISIT,  [facility]  [place]  Control  immunodeficiency    Erica. 
[address]  Provider:



 [facility]  [facility]    (chief  virus [HIV]  [address],  [place]rica



   [place]    complaint)  counselingEncoun    [place], [place],  Mani Burroughs, Josette      ter for initial    [address], [place].  [address]



   [address]      prescription of    tel:[phone],



   [address], [place],      contraceptive    [address]  [place],



   [place],      pillsEncntr      [place], [address].



   [id number],      screen for      tel:[phone]



   [place]      infections w      [id number]



   tel:[phone]      sexl mode of      



   [id number]      transmissPelvic      



         and perineal      



         pain      

 

   [facility]    Encounter for  Oct-1  Raji Elizabeth.  



   [facility]  [place]    removal of    [address]  



   [facility]      intrauterine  [address], [place],  



   [place]      contraceptive    [place], [address].  



   Josette Singleton      deviceEncounter    tel:[phone]  



   [address]      for initial    [address]  



   [address], [place],      prescription of      



   [place],      contraceptive      



   [id number],      pills      



   [place]            



   tel:[phone]            



   [phone]            

 

   [facility]    IUC,  Apr-0  Raphaelidis  



   [facility]  [place]    SurveillanceSTI    Maria Dolores. [address]  



   [facility]      Screening  [address],  



   [place]          [place],  



   [facility], [address]          [address].  



   [address]          tel:[phone]  



   [address], [place],          [address]  



   [place],            



   [id number],            



   [place]            



   tel:[phone]            



   [phone]            

 

   [facility]    Anemia,  Dec-  [place]  



   [facility]  [place]    ScreeningIUC,    [address]. [address]  



   [facility]      SurveillanceYeas  [address],  



   [place]      t-vulvovaginal    [place], [place],  



   [facility], [address]          [address].  



   [address]          tel:[phone]  



   [address], [place],          [address]  



   [place],            



   [id number],            



   [place]            



   tel:[phone]            



   [phone]            

 

   [facility]    Anemia,  Nov-  Ottoson  



   [facility]  [place]    ScreeningIUC  [address]  



   [facility]      Insertion  [address],  



   [place]          [place],  



   [facility], [address]          [id number].  



   [address]          tel:[phone]  



   [address], [place],          [address]  



   [place],            



   [id number],            



   [place]            



   tel:[phone]            



   [phone]            

 

   [facility]    GYN Exam,    [place]  



   [facility]  [place]    Routine WWESTI    [address]. [address]  



   [facility]      ScreeningFamily  [address],  



   [place]      [facility]    [place], [place],  



   [facility], [facility]      Counseling    [address].  



   [address]          tel:[phone]  



   [address], [place],          [address]  



   [place],            



   [id number],            



   [place]            



   tel:[phone]            



   [phone]            

 

   [facility]      Oct-  [place]  



   [facility]  [place]      [address]  



   [facility]        [address],  



   [place]          [place], [place],  



   [facility], [address]          [address].  



   [address]          tel:[phone]  



   [address], [place],          [address]  



   [place],            



   [id number],            



   [place]            



   tel:[phone]            



   [phone]            

 

   [facility]    STI ScreeningBCM  Oct-0  Parete  



   [facility]  [place]    Other,    Ellyn. [address]  



   [facility]      Surveillance  [address],  



   [address]          [place],  



   [facility], [address]          [address].  



   [address]          tel:[phone]  



   , [place],          [address]  



   [place],            



   [id number],            



   [place]            



   tel:[phone]            

 

   [facility]    [facility] EXAM  Dec-1  [place]  



   [facility]  [place]    NOS  [address]  



   [facility]        [address],  



   [address]          [place], [place],  



   [place]          [address].  



   [address]          tel:[phone]  



   , [place],          [address]  



   [place],            



   [id number],            



               



   tel:[phone]            







Family History







 Family Member  Diagnosis  Age At Onset

 

 Father  No history of Stroke  

 

 Sister  No history of Myocardial infarction  

 

 Sister  No history of Stroke  

 

 Mother  No history of Myocardial infarction  

 

 Brother  No history of Myocardial infarction  

 

 Father  No history of Myocardial infarction  

 

 Brother  No history of Stroke  

 

 Mother  No history of Stroke  







Immunizations







 Vaccine  Date  Status  Comments

 

 Hep A (adult)  Dec-  administered  Note: PER HEALTH HX  ; Source:



       Source Unspecified

 

 Hep B, adult, 3 dose  Dec-  administered  Note: PER HEALTH HX ; Source:



       Source Unspecified

 

 HPV (quadrivalent)  Dec-  administered  Note: PER HEALTH HX ; Source:



       Source Unspecified

 

 measles, mumps and rubella  Dec-  administered  Note: PER HEALTH HX ; 
Source:



 virus vaccine      Source Unspecified







Payers







 Payer name  Insurance type  Covered party ID  Authorization(s)

 

 [employer] PRES[employer]TIVE ELIGIBILITY    [id number]  







Social History







 Type  Description  Quantity  Date Captured  Comments

 

 Alcohol Use Details  Unknown    Dec-  

 

 Caffeine Use Details  Unknown    Dec-  

 

 Tobacco Use Status  Ex-cigarette smoker    Dec-  

 

 Smoking Status  Former smoker    Dec-  

 

 Smoking Tobacco Use  Cigarette: No Details Available  Cigarette: 0.5 Packs per 
day  Dec-  



 Details        

 

 Birth Sex  Female      







Vital Signs







 Date /  Height  Weight  BMI  Pulse  Blood  Temperature  Respiratory  Body  
Head  BMI  Pulse  Inhaled



 Time:        Rate  Pressure    Rate  Surface  Circumference  percentile  Ox  Ox



                 Area        

 

 Dec-26  63.00  140.00  24.8      in  lbs  0    mm[Hg]              



 3:56      kg/m                  



 PM      eter                  



       (2)                  







Chief Complaint And Reason For Visit

*** Most recent encounter only, dated '2018 15:50'. ***Birth Control (
chief complaint)



Reason For Referral







 Reason For Referral

 

 No information







Plan Of Treatment







 Date  Type  Action  Status

 

 Oct-  Goal  Tobacco cessation counseling  completed

 

   Appointment  ABNER URIARTE  CANCELLED







History Of Present Illness







 Encounter Date  Complaint  History Of Present Illness









 No information







Functional Status







 Date  Functional Assessment









 No information







Medications Administered







 Medication  Instructions  Dosage  Effective Dates (start - stop)  Status  
Comments









 No information







Instructions







 Date  Instruction  Additional Information









 No information







Assessments







 Type  Assessment  Date

 

 assessment  Human immunodeficiency virus [HIV] counseling  Dec-

 

 assessment  Encounter for initial prescription of contraceptive pills  Dec-26-
2018

 

 assessment  Encntr screen for infections w sexl mode of transmiss  Dec-

 

 assessment  Pelvic and perineal pain  Dec-







Goals







 Health Concern  Goal  Type  Priority  Status  Date









 No information







Medical Equipment







 Description  Device  Universal Device Identifier  Effective Dates (start - stop
)  Status









 No information







Mental Status







 Date  Cognitive Assessment

 

 Dec-  Normal Orientation







Health Concerns







 Observation  Date









 No information









 Concern  Status  Date









 No information

## 2019-01-16 NOTE — UC
Abdominal Pain Female HPI





- HPI Summary


HPI Summary: 





29 y/o female  presents to the urgent care c/o pelvic pain since 1/9/2019. Pt w

/ PMHX of PCOS.  Pt reports she was seen at [place] [facility] on 1/9/2019 and Dx with 

PID and Rx Doxyxycline PO. Pt was told she an ovarian cyst burst since 

transvaginal US was positive for free intraperitoneal fluid in the culd-de-Sac. 

Pt has been taken ABX. She just got her menstrual cycle 1/13/2019, but she is 

unsure if it is her period, since LMP: 12/13/2019. She has mild nausea and b/l 

pelvic pain today. Pain is 6/10. Pt denies fever, vaginal discharge, Hx of STD's

, HA, urinary symptoms, flank pain, SOB, chest pain, abdominal pain, V/D. 








- History of Current Complaint


Chief Complaint: UCGU


Stated Complaint: PERSONAL


Time Seen by Provider: 01/16/19 14:03


Hx Obtained From: Patient


Hx Last Menstrual Period: 1/13/19


Pregnant?: No


Onset/Duration: Gradual Onset, Lasting Weeks - 1 week, Still Present


Timing: Constant


Severity Initially: Moderate


Severity Currently: Moderate


Pain Intensity: 6


Pain Scale Used: 0-10 Numeric


Location: Suprapubic


Radiates: No


Character: Colicy


Aggravating Factor(s): Other: - her period


Alleviating Factor(s): Medications


Associated Signs and Symptoms: Positive: Vaginal Bleeding, Nausea.  Negative: 

Diaphoresis, Fever, Cough, Chest Pain, Dizzy, Back Pain, Constipation, Urinary 

Symptoms, Decreased Appetite, Vaginal Discharge, Vomiting, Diarrhea





- Risk Factors


Ectopic Pregnancy Risk Factor: Negative


Ovarian Torsion Risk Factor: Negative


Allergies/Adverse Reactions: 


 Allergies











Allergy/AdvReac Type Severity Reaction Status Date / Time


 


No Known Allergies Allergy   Verified 01/16/19 14:02














PMH/Surg Hx/FS Hx/Imm Hx


Previously Healthy: Yes


Other Endocrine History: PCOS





- Surgical History


Surgical History: Yes


Surgery Procedure, Year, and Place: wisdom teeth





- Family History


Known Family History: Positive: Hypertension


Family History: breast cancer, lung cancer





- Social History


Occupation: Employed Full-time


Lives: With Family


Alcohol Use: None


Substance Use Type: Marijuana


Substance Use Comment - Amount & Last Used: regularly


Smoking Status (MU): Former Smoker


Amount Used/How Often: 3/4 ppd


Length of Time of Smoking/Using Tobacco: 10 years


Have You Smoked in the Last Year: Yes


Household Exposure Type: Cigarettes





Review of Systems


All Other Systems Reviewed And Are Negative: Yes


Constitutional: Positive: Negative


Skin: Positive: Negative


Eyes: Positive: Negative


ENT: Positive: Negative


Respiratory: Positive: Negative


Cardiovascular: Positive: Negative


Gastrointestinal: Positive: Nausea


Genitourinary: Positive: Other - pelvic pain w/ vaginal bleeding,possible 

menstrual cycle.  Negative: Dysuria, Hematuria, Frequency, Vaginal/Penile 

Discharge


Motor: Positive: Negative


Neurovascular: Positive: Negative


Musculoskeletal: Positive: Negative


Neurological: Positive: Negative


Psychological: Positive: Negative


Is Patient Immunocompromised?: No





Physical Exam





- Summary


Physical Exam Summary: 





Vital signs: reviewed


General: well developed, well nourished female sitting in the examining table w/

o any acute pain distress. 


Head: Normocephalic, no lesions. 


Eyes: PERRLA, EOM's full, conjunctiva clear, fundi grossly normal. 


Ears: EAC's clear, TM's normal. Nose: Mucosa normal, no obstruction. 


Throat: Clear, no exudates, no lesions. 


Neck: Supple, no masses, no thyromegaly, no bruits. 


Chest: Lungs clear, no rales, no rhonchi, no wheezes. 


Heart: RR, no murmurs, no rubs, no gallops. 


Abdomen: Soft, no tenderness, no masses, BS normal. 


Pelvic: I was chaperone by Nurse Ana robledo. External genitalia within normal 

limits. There is no lesions there is no masses noted. Speculum exam: The 

vaginal walls are within normal limits w/ normal  menstrual cycle, no the 

lesions or rashes. The cervix is closed with some erythema and cervical changes 

around 9 o'clock, no masses. There is mild RT side  CMT's, and no adnexal 

masses. 


Rectal: No lesions, no hemorrhoids, 


Back: Normal curvature, no tenderness. 


Extremities: FROM, no deformities, no edema, no erythema. 


Neuro: Physiological, no localizing findings. 


Skin: Normal, no rashes, no lesions noted. 








Triage Information Reviewed: Yes


Vital Signs: 


 Initial Vital Signs











Temp  98.0 F   01/16/19 13:54


 


Pulse  65   01/16/19 13:54


 


Resp  18   01/16/19 13:54


 


BP  115/59   01/16/19 13:54


 


Pulse Ox  97   01/16/19 13:54














Abd Pain Female Course/Dx





- Course


Course Of Treatment: 29 y/o female presents to the urgent care c/o pelvic pain 

since 1/9/2019. Pt w/ PMHX of PCOS Pt reports she was seen at [place] [facility] on 1/9/ 2019 and Dx with PID and Rx Doxyxycline PO. Pt was told she an ovarian cyst 

burst since transvaginal US was positive for free intraperitoneal fluid in the 

culd-de-Sac. Pt has been taken ABX. She just got her menstrual cycle 1/13/2019, 

but she is unsure if it is her period, since LMP: 12/13/2019. She has mild 

nausea and b/l pelvic pain today. Pain is 6/10. Pt denies fever, vaginal 

discharge, Hx of STD's, HA, urinary symptoms, flank pain, SOB, chest pain, 

abdominal pain, V/D. Hx obtained. PE: WNL. The vaginal walls are within normal 

limits w/ normal  menstrual cycle, no the lesions or rashes. The cervix is 

closed with some erythema and cervical changes around 9 o'clock, no masses. 

There is mild RT side  CMT, and no adnexal masses on examination. Pt w/ recent 

7cm ovarian cyst rupture on 1/9/2019 w/ her menstrual cycle which has probably 

exacerbated her pelvic pain. Pt given a referral w/ GYN Dr Wall for a PAP and 

further evaluation on her symptoms. Pt Rx [place] PO and Zofran PO to alleviate 

symptoms. Advised to continue taking Doxyxycline PO. D/C instructions 

explained. Pt understood and agreed w/ plan of care and left the clinic 

hemodynamically stable A&OX3.





- Differential Dx/Diagnosis


Differential Diagnosis: Ovarian Cyst, Pelvic Inflammatory Disease, Urinary 

Tract Infection


Provider Diagnosis: 


 Pelvic pain, PID (acute pelvic inflammatory disease)








Discharge





- Sign-Out/Discharge


Documenting (check all that apply): Patient Departure - D/C home


All imaging exams completed and their final reports reviewed: No Studies





- Discharge Plan


Condition: Stable


Disposition: HOME


Prescriptions: 


HYDROcodone/ACETAMIN 5-325 MG* [Norco 5-325 TAB*] 1 tab PO Q6H PRN #12 tab MDD 

1g/4h-4g/day


 PRN Reason: Pain


Patient Education Materials:  Pelvic Inflammatory Disease (ED)


Forms:  *Work Release


Referrals: 


[facility] [place] PHYSICIAN REFERRAL [Outside] - 3 Days


Rosa Wall MD [Medical Doctor] - 2 Days


Additional Instructions: 


1-Please f/u with GYN Dr Wall for a PAP as soon as possible for further 

evaluation and treatment.


2- Please continue taking Doxycyline PO as directed in the ER.


3- Take Norco PO as directed and if pelvic pain doesn't improve please take 

Ibuprofen PO 600mg PO q6-8hrs after meals








- Billing Disposition and Condition


Condition: STABLE


Disposition: Home

## 2019-04-25 ENCOUNTER — HOSPITAL ENCOUNTER (EMERGENCY)
Dept: HOSPITAL 25 - UCEAST | Age: 29
Discharge: HOME | End: 2019-04-25
Payer: SELF-PAY

## 2019-04-25 VITALS — SYSTOLIC BLOOD PRESSURE: 127 MMHG | DIASTOLIC BLOOD PRESSURE: 86 MMHG

## 2019-04-25 DIAGNOSIS — Z32.02: ICD-10-CM

## 2019-04-25 DIAGNOSIS — N73.9: ICD-10-CM

## 2019-04-25 DIAGNOSIS — Z87.891: ICD-10-CM

## 2019-04-25 DIAGNOSIS — R10.2: Primary | ICD-10-CM

## 2019-04-25 LAB
BASOPHILS # BLD AUTO: 0 10^3/UL (ref 0–0.2)
EOSINOPHIL # BLD AUTO: 0 10^3/UL (ref 0–0.6)
HCT VFR BLD AUTO: 47 % (ref 33–41)
HGB BLD-MCNC: 16 G/DL (ref 12–16)
LYMPHOCYTES # BLD AUTO: 2.7 10^3/UL (ref 1–4.8)
MCH RBC QN AUTO: 29 PG (ref 27–31)
MCHC RBC AUTO-ENTMCNC: 34 G/DL (ref 31–36)
MCV RBC AUTO: 84 FL (ref 80–97)
MONOCYTES # BLD AUTO: 0.4 10^3/UL (ref 0–0.8)
NEUTROPHILS # BLD AUTO: 5.4 10^3/UL (ref 1.5–7.7)
NRBC # BLD AUTO: 0 10^3/UL
NRBC BLD QL AUTO: 0
PLATELET # BLD AUTO: 275 10^3/UL (ref 150–450)
RBC # BLD AUTO: 5.61 10^6 /UL (ref 3.7–4.87)
WBC # BLD AUTO: 8.7 10^3/UL (ref 3.5–10.8)

## 2019-04-25 PROCEDURE — 99212 OFFICE O/P EST SF 10 MIN: CPT

## 2019-04-25 PROCEDURE — 85652 RBC SED RATE AUTOMATED: CPT

## 2019-04-25 PROCEDURE — 87661 TRICHOMONAS VAGINALIS AMPLIF: CPT

## 2019-04-25 PROCEDURE — 84702 CHORIONIC GONADOTROPIN TEST: CPT

## 2019-04-25 PROCEDURE — G0463 HOSPITAL OUTPT CLINIC VISIT: HCPCS

## 2019-04-25 PROCEDURE — 85025 COMPLETE CBC W/AUTO DIFF WBC: CPT

## 2019-04-25 PROCEDURE — 87480 CANDIDA DNA DIR PROBE: CPT

## 2019-04-25 PROCEDURE — 81003 URINALYSIS AUTO W/O SCOPE: CPT

## 2019-04-25 PROCEDURE — 36415 COLL VENOUS BLD VENIPUNCTURE: CPT

## 2019-04-25 PROCEDURE — 87491 CHLMYD TRACH DNA AMP PROBE: CPT

## 2019-04-25 PROCEDURE — 96372 THER/PROPH/DIAG INJ SC/IM: CPT

## 2019-04-25 PROCEDURE — 87591 N.GONORRHOEAE DNA AMP PROB: CPT

## 2019-04-25 PROCEDURE — 87070 CULTURE OTHR SPECIMN AEROBIC: CPT

## 2019-04-25 PROCEDURE — 87510 GARDNER VAG DNA DIR PROBE: CPT

## 2019-04-25 NOTE — UC
Abdominal Pain Female HPI





- HPI Summary


HPI Summary: 





27 YO FEMALE with a three day hx of pelvic pain


pain started as her period stopped


pain constant and worsening


currently 6-7 /10


hurts to walk


no f/c


no n/v/d


denies vag d/c


hx PCOS


hx ovarian cyst rupture





no UTI symptoms





Has an appt early June with her gyn (?Dr. Prather)











- History of Current Complaint


Chief Complaint: UCAbdominalPain


Stated Complaint: ABDOMINAL PAIN


Time Seen by Provider: 04/25/19 16:24


Hx Obtained From: Patient


Hx Last Menstrual Period: ended 3 days ago


Onset/Duration: Gradual Onset, Lasting Days


Timing: Constant


Severity Initially: Mild


Severity Currently: Moderate


Pain Intensity: 7


Pain Scale Used: 0-10 Numeric


Location: Other - diffuse lower abd/pelvis


Character: Cramping


Aggravating Factor(s): Movement


Alleviating Factor(s): Nothing


Associated Signs and Symptoms: Negative: Diaphoresis, Fever, Cough, Chest Pain, 

Dizzy, Back Pain, Constipation, Blood in Stool, Urinary Symptoms, Decreased 

Appetite, Vaginal Bleeding, Vaginal Discharge, Nausea, Vomiting, Diarrhea


Female Torso: 


  __________________________














  __________________________





 1 - pain here





Allergies/Adverse Reactions: 


 Allergies











Allergy/AdvReac Type Severity Reaction Status Date / Time


 


No Known Allergies Allergy   Verified 04/25/19 15:59














PMH/Surg Hx/FS Hx/Imm Hx


Previously Healthy: Yes


GI/ History: Other


Other GI/ History: pyelo x 1, ovarian cyst rupture x 1, PCOS





- Surgical History


Surgical History: Yes


Surgery Procedure, Year, and Place: wisdom teeth





- Family History


Known Family History: Positive: None, Hypertension


Family History: breast cancer, lung cancer





- Social History


Alcohol Use: Rare


Substance Use Type: Marijuana


Substance Use Comment - Amount & Last Used: regularly


Smoking Status (MU): Former Smoker


Amount Used/How Often: 3/4 ppd


Length of Time of Smoking/Using Tobacco: 10 years


Have You Smoked in the Last Year: Yes


Household Exposure Type: Cigarettes





Review of Systems


All Other Systems Reviewed And Are Negative: Yes


Constitutional: Positive: Negative


Skin: Positive: Negative


Eyes: Positive: Negative


ENT: Positive: Negative


Respiratory: Positive: Negative


Cardiovascular: Positive: Negative


Gastrointestinal: Positive: Abdominal Pain


Genitourinary: Negative: Dysuria, Hematuria, Frequency, Urgency, Vaginal/Penile 

Burning, Vaginal/Penile Itching, Vaginal/Penile Discharge, Vaginal/Penile Pain, 

Vaginal/Penile Tenderness, Ulceration/Lesion, Abnormal Bleeding


Motor: Positive: Negative


Neurovascular: Positive: Negative


Musculoskeletal: Positive: Negative


Neurological: Positive: Negative


Psychological: Positive: Negative





Physical Exam


Triage Information Reviewed: Yes


Appearance: Well-Appearing, No Pain Distress, Well-Nourished


Vital Signs: 


 Initial Vital Signs











Temp  98.8 F   04/25/19 15:55


 


Pulse  78   04/25/19 15:55


 


Resp  17   04/25/19 15:55


 


BP  118/79   04/25/19 15:55


 


Pulse Ox  97   04/25/19 15:55











Eye Exam: Normal


Eyes: Positive: Conjunctiva Clear


ENT: Positive: Hearing grossly normal, Uvula midline.  Negative: Nasal 

congestion, Nasal drainage, Trismus, Muffled voice, Hoarse voice


Dental Exam: Normal


Neck: Positive: Supple, Nontender, No Lymphadenopathy


Respiratory: Positive: Lungs clear, Normal breath sounds, No respiratory 

distress, No accessory muscle use


Cardiovascular: Positive: RRR, No Murmur, Pulses Normal


Abdomen Description: Positive: No Organomegaly, Soft.  Negative: Nontender, CVA 

Tenderness (R), CVA Tenderness (L), Distended, Guarding, Hernia @, McBurney's 

Point Tenderness, Peritoneal Signs, Pulsatile Mass, Splenomegaly


Bowel Sounds: Positive: Present


Pelvic Exam: Positive: External Exam Normal, Cervicitis - cervix friable with 

mucopurulent D/C, Tender w/ Cervical Motion, Tender Adnexa - bilateral, Tender 

Uterus


Musculoskeletal: Positive: ROM Intact, No Edema


Neurological: Positive: Alert


Psychological Exam: Normal


Skin Exam: Normal





Re-Evaluation





- Re-Evaluation


  ** First Eval


Re-Evaluation Time: 17:53


Change: Improved


Comment: decreased pain after toradol





Abd Pain Female Course/Dx





- Course


Course Of Treatment: 





UA and UHCG (-)





- Differential Dx/Diagnosis


Provider Diagnosis: 


 Pelvic pain, PID (acute pelvic inflammatory disease)








Discharge





- Sign-Out/Discharge


Documenting (check all that apply): Patient Departure


All imaging exams completed and their final reports reviewed: No Studies





- Discharge Plan


Condition: Stable


Disposition: HOME


Prescriptions: 


DOXYcycline CAP(*) [DOXYcycline 100MG CAP(*)] 100 mg PO BID #28 cap


Patient Education Materials:  Pelvic Pain in Women (ED)


Forms:  *Work Release


Referrals: 


No Primary Care Phys,NOPCP [Primary Care Provider] - 


Additional Instructions: 


I am unsure of the cause of your pelvic pain


It may be due to PID


Tests are pending





To ER  for


INCREASED PAIN


FEVER


VOMITING


if not markedly improved in 1-2 days





CALL YOUR GYN AND SEE IF THEY CAN SEE YOU NEXT WEEK











- Billing Disposition and Condition


Condition: STABLE


Disposition: Home

## 2019-04-26 NOTE — PN
Progress Note





- Progress Note


Date of Service: 04/26/19


Note: 


patient is vaginal culture came back positive for gardnerella.  sent 

prescription for Flagyl 500 mg twice a day for 7 days.  Please call patient and 

informed of addition of antibiotic.  cautioned patient that she not drink 

alcohol on the medication.

## 2019-04-26 NOTE — UC
- Progress Note


Progress Note: 





Reviewed blood work from 4/25/19 - non-urgent.  However, F/u ObGyn as advised.  





Course/Dx





- Diagnoses


Provider Diagnoses: 


 Pelvic pain, PID (acute pelvic inflammatory disease)








Discharge





- Sign-Out/Discharge


Documenting (check all that apply): Post-Discharge Follow Up


All imaging exams completed and their final reports reviewed: No Studies





- Discharge Plan


Condition: Stable


Disposition: HOME


Prescriptions: 


DOXYcycline CAP(*) [DOXYcycline 100MG CAP(*)] 100 mg PO BID #28 cap


Patient Education Materials:  Pelvic Pain in Women (ED)


Forms:  *Work Release


Referrals: 


No Primary Care Phys,NOPCP [Primary Care Provider] - 


Additional Instructions: 


I am unsure of the cause of your pelvic pain


It may be due to PID


Tests are pending





To ER  for


INCREASED PAIN


FEVER


VOMITING


if not markedly improved in 1-2 days





CALL YOUR GYN AND SEE IF THEY CAN SEE YOU NEXT WEEK











- Billing Disposition and Condition


Condition: STABLE


Disposition: Home

## 2019-09-24 ENCOUNTER — HOSPITAL ENCOUNTER (EMERGENCY)
Dept: HOSPITAL 25 - UCEAST | Age: 29
Discharge: HOME | End: 2019-09-24
Payer: COMMERCIAL

## 2019-09-24 VITALS — SYSTOLIC BLOOD PRESSURE: 105 MMHG | DIASTOLIC BLOOD PRESSURE: 68 MMHG

## 2019-09-24 DIAGNOSIS — Z87.891: ICD-10-CM

## 2019-09-24 DIAGNOSIS — R10.2: Primary | ICD-10-CM

## 2019-09-24 PROCEDURE — 84702 CHORIONIC GONADOTROPIN TEST: CPT

## 2019-09-24 PROCEDURE — 87510 GARDNER VAG DNA DIR PROBE: CPT

## 2019-09-24 PROCEDURE — 87491 CHLMYD TRACH DNA AMP PROBE: CPT

## 2019-09-24 PROCEDURE — 87591 N.GONORRHOEAE DNA AMP PROB: CPT

## 2019-09-24 PROCEDURE — 99212 OFFICE O/P EST SF 10 MIN: CPT

## 2019-09-24 PROCEDURE — 81003 URINALYSIS AUTO W/O SCOPE: CPT

## 2019-09-24 PROCEDURE — 87086 URINE CULTURE/COLONY COUNT: CPT

## 2019-09-24 PROCEDURE — G0463 HOSPITAL OUTPT CLINIC VISIT: HCPCS

## 2019-09-24 PROCEDURE — 87480 CANDIDA DNA DIR PROBE: CPT

## 2019-09-24 NOTE — UC
Complaint Female HPI





- HPI Summary


HPI Summary: 





29 year old female with PMH + for endometriosis, PID (due to ruptured ovarian 

cysts, no h/o STD), presents iwth lower abdominal pain, urinary frequency/ 

urgency, discomfort with urination x 3-4 days.   temp ~ 99.  no fever, chills, 

no flank pain/ tenderness.  no other symptoms.   has had UTI in past, similar 

symptoms.  on OCP for endometriosis, no sexual activity x 2 months.  last 

menses 3 months ago d/t OCP 








- History Of Current Complaint


Chief Complaint: UCGU


Stated Complaint: ABDOMINAL PAIN


Time Seen by Provider: 09/24/19 15:33


Hx Obtained From: Patient


Hx Last Menstrual Period: med for endometriosis


Pregnant?: No


Onset/Duration: Sudden Onset, Lasting Days


Severity Initially: Moderate


Severity Currently: Moderate


Pain Intensity: 4


Pain Scale Used: 0-10 Numeric


Character: Dull, Burning, Cramping


Aggravating Factor(s): Urination


Associated Signs And Symptoms: Positive: Fever.  Negative: Vaginal Bleeding/

Discharge, Vaginal Discharge





- Allergies/Home Medications


Allergies/Adverse Reactions: 


 Allergies











Allergy/AdvReac Type Severity Reaction Status Date / Time


 


No Known Allergies Allergy   Verified 09/24/19 15:35











Home Medications: 


 Home Medications





Elagolix Sodium [Orilissa] 200 mg PO BID 09/24/19 [History Confirmed 09/24/19]











PMH/Surg Hx/FS Hx/Imm Hx





- Additional Past Medical History


Additional PMH: 





endometriosis, h/o UTI/ kidney infections


 


Previously Healthy: Yes





- Surgical History


Surgical History: Yes


Surgery Procedure, Year, and Place: wisdom teeth





- Family History


Known Family History: Positive: None, Hypertension, Non-Contributory


Family History: breast cancer, lung cancer





- Social History


Alcohol Use: Rare


Substance Use Type: Marijuana


Substance Use Comment - Amount & Last Used: regularly


Smoking Status (MU): Former Smoker


Amount Used/How Often: 3/4 ppd


Length of Time of Smoking/Using Tobacco: 10 years


Have You Smoked in the Last Year: Yes


When Did the Patient Quit Smoking/Using Tobacco: 2 years ago


Household Exposure Type: Cigarettes





Review of Systems


All Other Systems Reviewed And Are Negative: Yes


Constitutional: Positive: Fever - tmax 99


Genitourinary: Positive: Dysuria, Frequency, Urgency.  Negative: Hematuria, 

Vaginal/Penile Burning, Vaginal/Penile Itching, Vaginal/Penile Discharge, 

Vaginal/Penile Pain, Vaginal/Penile Tenderness


Motor: Positive: Negative


Neurological: Positive: Negative


Is Patient Immunocompromised?: No





Physical Exam


Triage Information Reviewed: Yes


Appearance: Well-Appearing, No Pain Distress, Well-Nourished


Vital Signs: 


 Initial Vital Signs











Temp  99.4 F   09/24/19 15:28


 


Pulse  83   09/24/19 15:28


 


Resp  16   09/24/19 15:28


 


BP  105/68   09/24/19 15:28


 


Pulse Ox  97   09/24/19 15:28











Vital Signs Reviewed: Yes


Eyes: Positive: Conjunctiva Clear


Abdomen Description: Positive: Nontender, No Organomegaly, Soft.  Negative: CVA 

Tenderness (R), CVA Tenderness (L), Distended, Guarding, Peritoneal Signs, 

Splenomegaly


Pelvic Exam: Positive: External Exam Normal - minimal irritation around vaginal 

opening, normal discharge color, consistency, odor, No Cerv. Motion Tender, No 

Masses, Tender Adnexa, Tender Uterus - b/l.  Negative: Active Bleeding, Blood, 

Cervicitis, Discharge, Lesions, Mass, Tender w/ Cervical Motion


Neurological Exam: Normal


Psychological Exam: Normal


Skin Exam: Normal





 Complaint Female Dx





- Course


Course Of Treatment: 


Pregnancy- negative 





U/A: negative 





Pelvic exam- TTP over uterus, normal vaginal exam otherwise 





- Continue to monitor symptoms, if worsens, return 


- Follow up with OB/ GYN tomorrow for possible imaging 


- Motrin/ Tylenol as needed for pain 


- Urine sent for cultures, should return within 1-2 days 


- Go to ER with flank pain/ back pain, fever > 102, increasing abdominal pain 


- Cultures sent for STD, BV, Yeast infection, should return within 1-2 days, 

you will be called with results. 











- Differential Dx/Diagnosis


Provider Diagnosis: 


 Pelvic pain








Discharge ED





- Sign-Out/Discharge


Documenting (check all that apply): Patient Departure


All imaging exams completed and their final reports reviewed: No Studies





- Discharge Plan


Condition: Good


Disposition: HOME


Patient Education Materials:  Pelvic Pain in Women (ED)


Referrals: 


No Primary Care Phys,NOPCP [Primary Care Provider] - 


[facility] [Outside]


Additional Instructions: 


- Continue to monitor symptoms, if worsens, return 


- Follow up with OB/ GYN tomorrow for possible imaging 


- Motrin/ Tylenol as needed for pain 


- Urine sent for cultures, should return within 1-2 days 


- Go to ER with flank pain/ back pain, fever > 102, increasing abdominal pain 


- Cultures sent for STD, BV, Yeast infection, should return within 1-2 days, 

you will be called with results. 








- Billing Disposition and Condition


Condition: GOOD


Disposition: Home





- Attestation Statements


Provider Attestation: 





Per institutional requirements, I have reviewed the chart, however, I was not 

consulted specifically or made aware of this patient by the  midlevel provider.

  I did not personally evaluate, interact with , or disposition  this patient.

## 2019-09-25 NOTE — UC
- Progress Note


Progress Note: 





NEg GC/CH


no change


ljj 9/25/19





Course/Dx





- Diagnoses


Provider Diagnoses: 


 Pelvic pain








Discharge ED





- Sign-Out/Discharge


Documenting (check all that apply): Post-Discharge Follow Up


All imaging exams completed and their final reports reviewed: No Studies





- Discharge Plan


Condition: Good


Disposition: HOME


Patient Education Materials:  Pelvic Pain in Women (ED)


Referrals: 


[facility] [Outside]


No Primary Care Phys,NOPCP [Primary Care Provider] - 


Additional Instructions: 


- Continue to monitor symptoms, if worsens, return 


- Follow up with OB/ GYN tomorrow for possible imaging 


- Motrin/ Tylenol as needed for pain 


- Urine sent for cultures, should return within 1-2 days 


- Go to ER with flank pain/ back pain, fever > 102, increasing abdominal pain 


- Cultures sent for STD, BV, Yeast infection, should return within 1-2 days, 

you will be called with results. 








- Billing Disposition and Condition


Condition: GOOD


Disposition: Home

## 2019-09-25 NOTE — UC
- Progress Note


Progress Note: 





neg candida, neg  Gardnerella 


no change


ljj 9/25/19





Course/Dx





- Diagnoses


Provider Diagnoses: 


 Pelvic pain








Discharge ED





- Sign-Out/Discharge


Documenting (check all that apply): Post-Discharge Follow Up


All imaging exams completed and their final reports reviewed: No Studies





- Discharge Plan


Condition: Good


Disposition: HOME


Patient Education Materials:  Pelvic Pain in Women (ED)


Referrals: 


[facility] [Outside]


No Primary Care Phys,NOPCP [Primary Care Provider] - 


Additional Instructions: 


- Continue to monitor symptoms, if worsens, return 


- Follow up with OB/ GYN tomorrow for possible imaging 


- Motrin/ Tylenol as needed for pain 


- Urine sent for cultures, should return within 1-2 days 


- Go to ER with flank pain/ back pain, fever > 102, increasing abdominal pain 


- Cultures sent for STD, BV, Yeast infection, should return within 1-2 days, 

you will be called with results. 








- Billing Disposition and Condition


Condition: GOOD


Disposition: Home

## 2019-12-19 ENCOUNTER — HOSPITAL ENCOUNTER (OUTPATIENT)
Dept: HOSPITAL 25 - OR | Age: 29
Discharge: HOME | End: 2019-12-19
Attending: OBSTETRICS & GYNECOLOGY
Payer: COMMERCIAL

## 2019-12-19 VITALS — SYSTOLIC BLOOD PRESSURE: 127 MMHG | DIASTOLIC BLOOD PRESSURE: 72 MMHG

## 2019-12-19 DIAGNOSIS — F41.8: ICD-10-CM

## 2019-12-19 DIAGNOSIS — Z87.891: ICD-10-CM

## 2019-12-19 DIAGNOSIS — D27.0: Primary | ICD-10-CM

## 2019-12-19 DIAGNOSIS — N80.1: ICD-10-CM

## 2019-12-19 DIAGNOSIS — R10.2: ICD-10-CM

## 2019-12-19 PROCEDURE — 81025 URINE PREGNANCY TEST: CPT

## 2019-12-19 PROCEDURE — 88342 IMHCHEM/IMCYTCHM 1ST ANTB: CPT

## 2019-12-19 PROCEDURE — 88305 TISSUE EXAM BY PATHOLOGIST: CPT

## 2019-12-19 PROCEDURE — 88341 IMHCHEM/IMCYTCHM EA ADD ANTB: CPT

## 2019-12-19 NOTE — OP
ATE OF OPERATION:  12/19/19 - [facility]

 

DATE OF BIRTH:  [birth year]

 

SURGEON:  Eloy Prather MD

 

ASSISTANT:  Dr. Jensen.

 

ANESTHESIA:  General anesthesia.

 

PRE-OP DIAGNOSIS:  Pelvic pain.

 

POST-OP DIAGNOSIS:  Pelvic pain plus endometriosis.

 

OPERATIVE PROCEDURE:  Laparoscopy.

 

ESTIMATED BLOOD LOSS:  Minimal.

 

COMPLICATIONS:  None.

 

FINDINGS:  On exam under anesthesia, uterus was mid positioned.  On laparoscopy
, the anterior bladder flaps contained clear endometrial implants that were 
miliary along the whole anterior bladder flap.  They were also noted along the 
left and right ovary.  There were no specific endometriomas noted along the 
left ovary in the cul-de-sac along the ovarian fossa.  Lateral to the 
uterosacral ligament was an inflammatory mass similar in appearance to the 
miliary endometriosis with clear vesicles and a hemorrhagic area approximately 
5 mm and an adhesion across it.  The rest of the uterus, the liver surface and 
the gallbladder appeared normal.  The appendix was not well visualized.

 

DESCRIPTION OF PROCEDURE:  The patient identified, procedure identified as a 
laparoscopy.  The patient was taken to the operating room, prepped and draped 
in the usual fashion in dorsal lithotomy position under general anesthesia.  A 
sponge stick was placed in the vagina for manipulation.  A small infraumbilical 
incision was made and a Veress needle inserted through this.  Abdomen was 
insufflated to 15 mmHg.  The Veress needle was removed and the trocar was 
inserted under direct visualization using a Visiport.  Lateral trocars were 
placed 8 cm lateral to the umbilicus on both sides under direct visualization.  
The above findings were noted. The inflammatory mass on the right ovary was 
grasped and using the LigaSure, was found to be pedunculated off the right 
ovary and it was cauterized and then excised.  Adhesion on this site was also 
lysed and excised.  The inflammatory mass was then placed into a 5 mm bag and 
brought out through the left abdominal trocar site.  Good hemostasis was 
verified and decision was to terminate the procedure at this point.  All 
instruments were removed from the abdomen.  The abdomen was deflated of CO2.  
The skin was closed with 4-0 Vicryl in a subcuticular fashion and with skin 
glue.  Sponge and sponge stick were removed from vagina and the patient 
returned to recovery room in stable condition.  All sponge and instrument 
counts were correct.

 

 [id number]/[facility] #: [id number]

[facility]

## 2020-02-03 ENCOUNTER — HOSPITAL ENCOUNTER (EMERGENCY)
Dept: HOSPITAL 25 - UCEAST | Age: 30
Discharge: HOME | End: 2020-02-03
Payer: COMMERCIAL

## 2020-02-03 VITALS — DIASTOLIC BLOOD PRESSURE: 65 MMHG | SYSTOLIC BLOOD PRESSURE: 110 MMHG

## 2020-02-03 DIAGNOSIS — Z87.891: ICD-10-CM

## 2020-02-03 DIAGNOSIS — Z88.0: ICD-10-CM

## 2020-02-03 DIAGNOSIS — M79.662: Primary | ICD-10-CM

## 2020-02-03 PROCEDURE — G0463 HOSPITAL OUTPT CLINIC VISIT: HCPCS

## 2020-02-03 PROCEDURE — 99211 OFF/OP EST MAY X REQ PHY/QHP: CPT

## 2020-02-03 NOTE — XMS REPORT
Continuity of Care Document (CCD)

 Created on:2020



Patient:Abner Uriarte

Sex:Female

:1990

External Reference #:[id number]





Demographics







 Address  [address] [facility] [address]



   [place] [address]

 

 Home Phone  [phone]

 

 Mobile Phone  [phone]

 

 Email Address  [email or web address]

 

 Preferred Language  en

 

 Marital Status  Not  or 

 

 Pentecostal Affiliation  Unknown

 

 Race  White

 

 Ethnic Group  Not  or 









Author







 Name  Eloy Prather M.D.

 

 Address  [address] [place] [address]









Problems







 Active Problems  Provider  Date

 

 Anxiety    Onset: 2016

 

 Depressive disorder    Onset: 2016

 

 Endometriosis of ovary  Eloy Prather M.D.  Onset: 2019

 

 Endometriosis of fallopian tube  Vicky Johnson CNM  Onset: 2019







Social History







 Type  Date  Description  Comments

 

 Birth Sex    Unknown  

 

 Cigarette Use    Former Cigarette Smoker  

 

 ETOH Use    Occasionally consumes  approx 1-2 drinks



     alcohol  qyr

 

 Recreational Drug Use    Uses Marijuana Daily  

 

 Tobacco Use  Start: Unknown End:  Patient is a former  



   Unknown  smoker  

 

 Smoking Status  Reviewed: 20  Patient is a former  



     smoker  

 

 Exercise Type/Frequency    Exercises regularly  

 

 Seat Belt/Car Seat    Always uses seat belt  







Allergies, Adverse Reactions, Alerts







 Active Allergies  Reaction  Severity  Comments  Date

 

 Amoxicillin        2020









 Inactive Allergies









 NKDA        2019







Medications







 Active Medications  SIG  Qnty  Indications  Ordering  Date



         Provider  

 

 Lupron Depot  one vial  1units  N80.0  Eloy PALACIOS  2020



 (1-Month)  intramuscular every      ZARIA Prather  



           3.75mg  month x 3        



 Kit          



           

 

 Ibuprofen  1 by mouth four times  30tabs  Z01.818  Eloy PALACIOS  2019



           600mg  a day 2days prior to      ZARIA Prather  



 Tablets  period and 1st 2 days        



   of period        









 History Medications









 Amoxicillin  take one by  21tabs    Vicky Johnson CNM  2019 -



           500mg  mouth every 8        2020



 Tablets  hours (3 times/        



   day) for 1 week        

 

 Orilissa  Take 1 Tablet By  56tabs    Eloy Prather  2019 -



        200mg Tablets  Mouth Two Times      M.DJulissa  2020



   Daily        







Medications Administered in Office







 Medication  SIG  Qnty  Indications  Ordering Provider  Date

 

 PT SCRN Tbco Id as Non User        Eloy Prather M.D.  2019



                   Injection          



           

 

 PT SCRN Tbco Id as Non User        Eloy Prather M.D.  2019



                   Injection          



           

 

 PT SCRN Tbco Id as Non User        Eloy Prather M.D.  2019



                   Injection          



           

 

 PT SCRN Tbco Id as Non User        Eloy Prather M.D.  2019



                   Injection          



           

 

 PT SCRN Tbco Id as Non User        Eloy Prather M.D.  2019



                   Injection          



           







Immunizations







 CPT Code  Status  Date  Vaccine  Lot #

 

 U-Td  Given  10/19/2010  Td(Adult),Unspecified  

 

 68709  Given  2008  Gardasil Vaccine For HPV  

 

   Given  10/01/2003  Influenza Vaccine Not Specified Administered Age 3  



       And Older  

 

 05136  Given  12/15/1999  Hepatitis B Vaccine Pediatric/Adolescent  

 

 57546  Given  1999  Hepatitis B Vaccine Pediatric/Adolescent  

 

 97151  Given  1999  Hepatitis B Vaccine Pediatric/Adolescent  

 

 U-Polio  Given  1996  Polio,Unspecified  

 

 U-DTaP  Given  1996  DTaP,Unspecified  

 

 58902  Given  1996  MMR Vaccine  

 

 U-DTaP  Given  1994  DTaP,Unspecified  

 

 U-Polio  Given  1994  Polio,Unspecified  

 

 26631  Given  1993  Hib Hboc Conjugate 4 Dose Schedule  

 

 10907  Given  1993  MMR Vaccine  

 

 U-DTaP  Given  1993  DTaP,Unspecified  

 

 66837  Given  1993  Hib Hboc Conjugate 4 Dose Schedule  

 

 U-Polio  Given  1992  Polio,Unspecified  

 

 U-DTaP  Given  1992  DTaP,Unspecified  

 

 80746  Given  1992  Hib Hboc Conjugate 4 Dose Schedule  

 

 U-Polio  Given  1992  Polio,Unspecified  

 

 U-DTaP  Given  1992  DTaP,Unspecified  

 

 71971  Given  1992  Hib Hboc Conjugate 4 Dose Schedule  







Vital Signs







 Date  Vital  Result  Comment

 

 2020  8:11am  BP Systolic  116 mmHg  









 BP Diastolic  58 mmHg  

 

 Height  62 inches  5'2"

 

 Weight  157.00 lb  

 

 BMI (Body Mass Index)  28.7 kg/m2  

 

 Last Menstrual Period  0128775  

 

   0  

 

 Parity  0  









 2019  3:04pm  BP Systolic  118 mmHg  









 BP Diastolic  72 mmHg  

 

 Body Temperature  98.0 F  

 

 Height  62 inches  5'2"

 

 Weight  157.00 lb  

 

 BMI (Body Mass Index)  28.7 kg/m2  

 

   0  

 

 Parity  0  







Results







 Test  Acquired Date  Facility  Test  Result  H/L  Range  Note

 

 Laboratory test  2019  [facility]  Surgical  SEE RESULT      [address]



 [place] [address]  Pathology  [place]      



     [phone]          

 

 Laboratory test  2019  [facility]  Surgical  SEE RESULT      2



 [place] [address]  Pathology  [place]      



     [phone]          









 1  SEE RESULT BELOW



   Name:  ABNER URIARTE              : 1990    Attend Dr: Eloy Prather MD



   Acct:  [id number]  Unit: [id number]  AGE: 29            Location:  OR



   Re19                        SEX: F             Status:    [facility]



   -----------------------------------------------------------------------------
---------------



   



   SPEC: [id number]            KENNY: 19-          SUBM DR: Eloy Prather MD



   REQ:  [id number]             RECD: 



   STATUS: SOUT



   _



   ORDERED:  LEVEL 4



   



   FINAL DIAGNOSIS



   Ovary, right, excision:



   -- Adenofibroma.



   -----------------------------------------------------------------------------
---------------



   



   



   PRE-OPERATIVE DIAGNOSIS



   Endometriosis of ovary, pelvic, perineal pain



   



   



   GROSS DESCRIPTION



   The specimen is received in formalin labeled, Ovarian Mass-Right, and 
consists of a 2.4 x



   2.0 by up to 1.1 cm tan-pink irregular bosselated papilliferous rubbery soft 
tissue fragment



   which is serially sectioned and entirely submitted in two cassettes.



   



   Signed by and Reported on: __________              Maribel Cowart MD 
19 [id number]



   



   



   -----------------------------------------------------------------------------
---------------



   



   



   



   



   



   



   



   



   ** END OF REPORT **



   



   



   DEPARTMENT OF PATHOLOGY,  [address] [place] [address]



   Phone # [phone]      Fax #[phone]



   Devan Moody M.D. Director     [facility] # [id number]

 

 2  SEE RESULT BELOW



   Name:  ABNER URIARTE              : 1990    Attend Dr: Eloy Prather MD



   Acct:  [id number]  Unit: [id number]  AGE: 29            Location:  OR



   Re19                        SEX: F             Status:    [facility]



   -----------------------------------------------------------------------------
---------------



   



   SPEC: [id number]            KENNY: 19-          SUBM DR: Eloy Prather MD



   REQ:  [id number]             RECD: 3125



   STATUS: SOUT



   _



   ORDERED:  LEVEL 4, IHC TECH, ADD, IHC TECH, 1ST



   



   



   ADDENDUM



   Addendum: This case is discussed with an reviewed after discussion with Dr. Prather regarding



   intraoperative findings and clinical history of progestational agent 
therapy.  The current



   sample demonstrates a polypoid growth pattern with dense fibrous stroma 
focal cyst formation



   with the polypoid elements and cyst lined primarily by low columnar 
epithelium demonstrating



   significant tubal metaplasia.  No features suggesting a serous neoplasm are 
seen.  In this



   context these changes likely represent maturation/tubal metaplasia of an 
endometriotic



   lesion in the context of processed gestational agent therapy.



   Immunohistochemical stains for calponin and desmin were performed with 
appropriate controls



   at [facility] and interpreted by [facility] pathology and are negative.



   Addendum Signed ______(signature on file)______ Devan Moody MD  [id number]



   -----------------------------------------------------------------------------
---------------



   



   



   FINAL DIAGNOSIS



   Ovary, right, excision:



   -- Adenofibroma.



   -----------------------------------------------------------------------------
---------------



   



   



   



   



   ** CONTINUED ON NEXT PAGE **



   



   DEPARTMENT OF PATHOLOGY,  [address] [place] [address]



   Phone # [phone]      Fax #[phone]



   Devan Moody M.D. Director     [facility] # [id number]



   



   



   



   



   



   



   PRE-OPERATIVE DIAGNOSIS



   Endometriosis of ovary, pelvic, perineal pain



   



   



   GROSS DESCRIPTION



   The specimen is received in formalin labeled, Ovarian Mass-Right, and 
consists of a 2.4 x



   2.0 by up to 1.1 cm tan-pink irregular bosselated papilliferous rubbery soft 
tissue fragment



   which is serially sectioned and entirely submitted in two cassettes.



   



   Signed by and Reported on: __________              Maribel Cowart MD 
[id number]



   



   



   -----------------------------------------------------------------------------
---------------



   



   



   



   



   



   



   



   



   



   



   



   



   



   



   



   



   



   



   



   



   



   



   



   



   



   



   



   



   



   ** END OF REPORT **



   



   



   DEPARTMENT OF PATHOLOGY,  [address] [place] [address]



   Phone # [phone]      Fax #[phone]



   Devan Moody M.D. Director     [facility] # [id number]







Procedures







 Date  Code  Description  Status

 

 2019  84401  Diagnostic Laparoscopy  Completed







Medical Devices







 Description

 

 No Information Available







Encounters







 Type  Date  Location  Provider  Dx  Diagnosis

 

 Office Visit  2019  [facility]  Eloy Prather,  Z01.818  Encounter 
for other



   3:20p    M.D.    preprocedural



           examination

 

 Office Visit  2019  [facility]  Eloy Prather,  N80.1  Endometriosis 
of ovary



   2:20p    M.D.    

 

 Office Visit  2019  [facility] Office  Eloy Prather  N80.1  Endometriosis 
of ovary



   11:00a    M.D.    







Assessments







 Date  Code  Description  Provider

 

 2020  N80.0  Endometriosis of uterus  Eloy Prather M.D.

 

 2019  Z09  Encounter for follow-up examination after  Vicky Johnson CNM



     completed treatment for conditions other  



     than malignant neoplasm  

 

 2019  R10.2  Pelvic and perineal pain  Eloy Prather M.D.

 

 2019  N80.1  Endometriosis of ovary  Eloy Prather M.D.

 

 2019  Z01.818  Encounter for other preprocedural  Eloy Prather M.D.



     examination  

 

 2019  N80.1  Endometriosis of ovary  Eloy Prather M.D.

 

 2019  N80.1  Endometriosis of ovary  Eloy Prather M.D.







Plan of Treatment

2019 - Eloy Prather M.D.Z01.818 Encounter for other preprocedural 
examinationComments:DISCUSSED RISKS OF SURGERY FOR LAPAROSCOPY INCLUDING BUT 
NOT LIMITED TO INJURY TO BOWEL, BLADDER, URETERS, NERVES AND BLOOD VESSELS. 
DISCUSSED BLEEDING AND INFECTION RISKS AND POSSIBLE NEED FOR TRANSFUSION. PT 
UNDERSTANDS RISK OF WOUND COMPLICATION INCLUDING BREAKDOWN AND HERNIA. RISK OF 
DEEP VENOUS THROMBOSIS AND SEQUELAE. REASONS FOR SURGERY AND THE BENEFITS 
DERIVED. PT UNDERSTANDS THE ALTERNATIVESAND HAS HAD OPPORTUNITY TO ASK 
QUESTIONS WITH APPROPRIATE ANSWERS GIVEN.LENGTH OF TYPICAL HOSPITAL STAY AND 
FULL RECOVERY IF NO COMPLICATIONS OCCUR DISCUSSED.PREVENTATIVE MEASURES FOR 
CERTAIN COMPLICATIONS DISCUSSED.



Functional Status







 Description

 

 No Information Available







Mental Status







 Description

 

 No Information Available







Referrals







 Description

 

 No Information Available

## 2020-02-03 NOTE — UC
Lower Extremity/Ankle HPI





- HPI Summary


HPI Summary: 





30 y/o female presents to the urgent care c/o  left lower leg pain with 

pulsating  sensation for the past 3-4 days.  Pt reports Hx of endometriosis and 

had Laparascopic surgery  about a months ago.  She was told to rest and walk 

after a week, However she hasn't been able since in the surgery they couldn't 

removed all the endometriosis.  she got her period on 1/6/2020 and was very 

painful despite taking Orilissa PO, that she has been in bed and not walking as 

recommended.  Her left jaleel pain started suddenly. She is very concerned w/ a 

blood clot and requests an US. Pain is 4/10 at rest and constant and pulsating 

and 6/10 when she walks. Pt denies fever, SOB, dizziness, numbness or tingling 

sensation, chest pain, abdominal pain, N/V/D. 








- History of Current Complaint


Chief Complaint: UCLowerExtremity


Stated Complaint: LEG CRAMPS/SURGERY 1 MONTH AGO


Time Seen by Provider: 02/03/20 13:41


Hx Obtained From: Patient


Hx Last Menstrual Period: 010620


Pregnant?: No


Onset/Duration: Sudden Onset, Lasting Days - 4 days, Still Present, Worse Since 

- today


Severity Initially: Mild


Severity Currently: Moderate


Pain Intensity: 6 - w/ walking


Pain Scale Used: 0-10 Numeric


Aggravating Factor(s): Ambulation


Alleviating Factor(s): Rest, Elevation


Able to Bear Weight: Yes





- Risk Factors


Gout Risk Factors: Negative


DVT Risk Factors: Oral Contraceptives, Recent Period Of Bedrest, Recent Surgery


Septic Arthritis Risk Factor: Negative





- Allergies/Home Medications


Allergies/Adverse Reactions: 


 Allergies











Allergy/AdvReac Type Severity Reaction Status Date / Time


 


amoxicillin Allergy  GI Upset Verified 02/03/20 12:57














PMH/Surg Hx/FS Hx/Imm Hx


Previously Healthy: Yes


Other GI/ History: Endometriosis





- Surgical History


Surgical History: Yes


Surgery Procedure, Year, and Place: wisdom teeth





- Family History


Known Family History: Positive: None, Hypertension, Non-Contributory


Family History: breast cancer, lung cancer





- Social History


Alcohol Use: Rare


Substance Use Type: Marijuana


Substance Use Comment - Amount & Last Used: regularly, for pain


Smoking Status (MU): Former Smoker


Amount Used/How Often: 3/4 ppd- vapes now


Length of Time of Smoking/Using Tobacco: 10 years


Have You Smoked in the Last Year: Yes


When Did the Patient Quit Smoking/Using Tobacco: 2 years ago


Household Exposure Type: Cigarettes





Review of Systems


All Other Systems Reviewed And Are Negative: Yes


Constitutional: Positive: Negative


Skin: Positive: Negative


Eyes: Positive: Negative


ENT: Positive: Negative


Respiratory: Positive: Negative


Cardiovascular: Positive: Negative


Gastrointestinal: Positive: Negative


Genitourinary: Positive: Negative


Motor: Positive: Negative


Neurovascular: Positive: Negative


Musculoskeletal: Positive: Other: - left calf pain worse w/ walking


Neurological: Positive: Negative


Psychological: Positive: Negative


Is Patient Immunocompromised?: No





Physical Exam





- Summary


Physical Exam Summary: 





Vital Signs Reviewed: Yes


Appearance: Well-Appearing, No Pain Distress, Well-Nourished, female sitting in 

the examining table w/o any apparent pain distress


Eyes: Positive: Conjunctiva Clear - PERRLA< ARPITA, fundi grossly WNL


ENT: Positive: Normal ENT inspection, Hearing grossly normal, Pharynx normal, 

TMs normal, Uvula midline


Neck: Positive: Supple, Nontender, No Lymphadenopathy


Respiratory: Positive: Chest non-tender, Lungs clear, Normal breath sounds, No 

respiratory distress


Cardiovascular: Positive: RRR, No Murmur, Pulses Normal, Brisk Capillary Refill


Abdomen Description: Positive: Nontender, No Organomegaly, Soft.  Negative: CVA 

Tenderness (R), CVA Tenderness (L)


Bowel Sounds: Positive: Present


Extremities: R/L extremity with/without deformity or asymmetry when compared to 

the R/L.  No soft tissue swelling or edema.  No overlying erythema, warmth, 

discoloration.  No lesions or break in skin integrity.  Diameter of calves 14 

cm.  Soft tissues of posterior lower  legs are soft, supple, nontender and no 

palpable cords or evidence of thrombophlebitis.  No evidence of gangrene or 

compartment syndrome.  Medial thigh is without soft tissue swelling or tender 

to palpation.  Negative Homans sign.  Pint tendess on the mid posterior side 

of the left lower leg.  No proximal lymphangitis or lymphadenopathy. Positive 

sensation over B/l lower legs, positive pulses, capillary refill intact and 

brisk.


Neurological Exam: Normal


Psychological Exam: Normal


Skin Exam: Normal





Triage Information Reviewed: Yes


Vital Signs: 


 Initial Vital Signs











Temp  98.7 F   02/03/20 12:52


 


Pulse  66   02/03/20 12:52


 


Resp  16   02/03/20 12:52


 


BP  103/63   02/03/20 12:52


 


Pulse Ox  100   02/03/20 12:52














Lower Extremity Course/Dx





- Course


Course Of Treatment: 


 30 y/o female presents to the urgent care c/o  left lower leg pain with 

pulsating  sensation for the past 3-4 days.  Pt reports Hx of endometriosis and 

had Laparascopic surgery  about a months ago.  She was told to rest and walk 

after a week, However she hasn't been able since in the surgery they couldn't 

removed all the endometriosis.  she got her period on 1/6/2020 and was very 

painful despite taking Orilissa PO, that she has been in bed and not walking as 

recommended.  Her left jaleel pain started suddenly. She is very concerned w/ a 

blood clot and requests an US. Pain is 4/10 at rest and constant and pulsating 

and 6/10 when she walks. Pt denies fever, SOB, dizziness, numbness or tingling 

sensation, chest pain, abdominal pain, N/V/D. Hx obtained. 


PE: WNL except w/ tenderness on palpation in the posterior mid Lower leg, Sadiq'

s negative. Pt explained there  is probably not a blood and it may be a 

varicose vein.  However she insists on getting Us.  Pt explained the wait will 

be long since there are 4 patient before her and Pt states she rather wait and 

have a piece of mind.  Duplex ultrasound ordered to r/o DVT:Impressione:VEINS: 

The venous system of the left lower extremity is compressible throughout its 

course, with normal flow on color Doppler imaging and normal response to 

augmentation on spectral Doppler imaging. SOFT TISSUES: Unremarkable. OTHER 

FINDINGS: None. IMPRESSION: NO LEFT LOWER EXTREMITY DEEP VEIN THROMBOSIS. Pt 

educated on results. Pt Advised to take Ibuprofen PO to alleviate symptoms and 

to avoid strenuous exercise, but she should walk. If not improvement of 

symptoms to f/u w/ his PCP 2-3 days for further treatment. D/C instructions 

explained. Pt understood and agreed w/ plan of care. Pt left the clinic 

hemodynamically stable, A&OX3




















- Differential Dx/Diagnosis


Differential Diagnosis/HQI/PQRI: Compartment Syndrome, Contusion, DVT, Sprain, 

Strain, Tendonitis


Provider Diagnosis: 


 Pain of left calf








Discharge ED





- Sign-Out/Discharge


Documenting (check all that apply): Patient Departure - D/C home


All imaging exams completed and their final reports reviewed: Yes





- Discharge Plan


Condition: Stable


Disposition: HOME


Patient Education Materials:  Leg Pain (ED)


Referrals: 


[facility] PHYSICIAN REFERRAL [Outside] - 3 Days


Additional Instructions: 


1- Please continue taking Tylenol PO q6-8hrs prn as instructed after meals to 

alleviate  for pain.  rest, elevate your leg and avoid strenuous exercise


2-If symptoms do not improve or worsen please w/ Sports Medicine or your PCP 

for further  further evaluation and treatment.








- Billing Disposition and Condition


Condition: STABLE


Disposition: Home

## 2020-02-03 NOTE — XMS REPORT
Continuity of Care Document (CCD)

 Created on:2020



Patient:Abner Uriarte

Sex:Female

:1990

External Reference #:[id number]





Demographics







 Address  [address]



   [place] [address]

 

 Home Phone  [phone]

 

 Mobile Phone  [phone]

 

 Email Address  [email or web address]

 

 Preferred Language  en

 

 Marital Status  Not  or 

 

 Sabianist Affiliation  Unknown

 

 Race  White

 

 Ethnic Group  Not  or 









Author







 Name  Kain Jensen JR, DO (transmitted by agent of provider Cortney Franks
)

 

 Address  [address]



   [place] [address]









Problems







 Active Problems  Provider  Date

 

 Anxiety    Onset: 2016

 

 Endometriosis of ovary  Eloy Prather M.D.  Onset: 2019

 

 Endometriosis of fallopian tube  Vicky Johnson CNM  Onset: 2019







Social History







 Type  Date  Description  Comments

 

 Birth Sex    Unknown  

 

 Cigarette Use    Former Cigarette Smoker  

 

 ETOH Use    Occasionally consumes  approx 1-2 drinks



     alcohol  qyr

 

 Recreational Drug Use    Uses Marijuana Daily  

 

 Tobacco Use  Start: Unknown End:  Patient is a former  



   Unknown  smoker  

 

 Smoking Status  Reviewed: 20  Patient is a former  



     smoker  

 

 Exercise Type/Frequency    Exercises regularly  

 

 Seat Belt/Car Seat    Always uses seat belt  







Allergies, Adverse Reactions, Alerts







 Active Allergies  Reaction  Severity  Comments  Date

 

 Amoxicillin        2020









 Inactive Allergies









 NKDA        2019







Medications







 Active Medications  SIG  Qnty  Indications  Ordering Provider  Date

 

 Orilissa  1 by mouth every  90tabs    Kain Jensen JR,  2020



        150mg Tablets  day      DO  



           

 

 Ibuprofen  1 by mouth four  30tabs  Z01.818  Eloy Prather,  2019



         600mg Tablets  times a day      M.D.  



   2days prior to        



   period and 1st 2        



   days of period        









 History Medications









 Lupron Depot  one vial  1units  N80.0  Eloy PALACIOS  2020 -



 (1-Month)  intramuscular every      Tyeshaber, M.D.  2020



           3.75mg  month x 3        



 Kit          



           

 

 Amoxicillin  take one by mouth  21tabs    Vicky  2019 -



             500mg  every 8 hours (Issac Johnson CNM  2020



 Tablets  times/ day) for 1        



   week        

 

 Orilissa  Take 1 Tablet By  56tabs    Eloy PALACIOS  2019 -



          200mg  Mouth Two Times Daily      ZARIA Prather  2020



 Tablets          



           







Medications Administered in Office







 Medication  SIG  Qnty  Indications  Ordering Provider  Date

 

 PT SCRN Tbco Id as Non User        Eloy Prather M.D.  2019



                   Injection          



           

 

 PT SCRN Tbco Id as Non User        Eloy Prather M.D.  2019



                   Injection          



           

 

 PT SCRN Tbco Id as Non User        Eloy Prather M.D.  2019



                   Injection          



           

 

 PT SCRN Tbco Id as Non User        Eloy Prather M.D.  2019



                   Injection          



           

 

 PT SCRN Tbco Id as Non User        Eloy Prather M.D.  2019



                   Injection          



           







Immunizations







 CPT Code  Status  Date  Vaccine  Lot #

 

 U-Td  Given  10/19/2010  Td(Adult),Unspecified  

 

 43464  Given  2008  Gardasil Vaccine For HPV  

 

   Given  10/01/2003  Influenza Vaccine Not Specified Administered Age 3  



       And Older  

 

 86069  Given  12/15/1999  Hepatitis B Vaccine Pediatric/Adolescent  

 

 64265  Given  1999  Hepatitis B Vaccine Pediatric/Adolescent  

 

 73680  Given  1999  Hepatitis B Vaccine Pediatric/Adolescent  

 

 U-Polio  Given  1996  Polio,Unspecified  

 

 U-DTaP  Given  1996  DTaP,Unspecified  

 

 18050  Given  1996  MMR Vaccine  

 

 U-DTaP  Given  1994  DTaP,Unspecified  

 

 U-Polio  Given  1994  Polio,Unspecified  

 

 01222  Given  1993  Hib Hboc Conjugate 4 Dose Schedule  

 

 64791  Given  1993  MMR Vaccine  

 

 U-DTaP  Given  1993  DTaP,Unspecified  

 

 63780  Given  1993  Hib Hboc Conjugate 4 Dose Schedule  

 

 U-Polio  Given  1992  Polio,Unspecified  

 

 U-DTaP  Given  1992  DTaP,Unspecified  

 

 33269  Given  1992  Hib Hboc Conjugate 4 Dose Schedule  

 

 U-Polio  Given  1992  Polio,Unspecified  

 

 U-DTaP  Given  1992  DTaP,Unspecified  

 

 73273  Given  1992  Hib Hboc Conjugate 4 Dose Schedule  







Vital Signs







 Date  Vital  Result  Comment

 

 2020 10:12am  BP Systolic  122 mmHg  









 BP Diastolic  74 mmHg  

 

 Height  62 inches  5'2"

 

 Weight  155.00 lb  

 

 BMI (Body Mass Index)  28.3 kg/m2  

 

 Last Menstrual Period  1886903  

 

   0  

 

 Parity  0  









 2020  8:11am  BP Systolic  116 mmHg  









 BP Diastolic  58 mmHg  

 

 Height  62 inches  5'2"

 

 Weight  157.00 lb  

 

 BMI (Body Mass Index)  28.7 kg/m2  

 

 Last Menstrual Period  6864057  

 

   0  

 

 Parity  0  







Results







 Test  Acquired Date  Facility  Test  Result  H/L  Range  Note

 

 Laboratory test  2019    Surgical  SEE RESULT      1



 [place] [address]  [facility]      



     [phone]          

 

 Laboratory test  2019    Surgical  SEE RESULT      2



 [place] [address]  [facility]ology  [facility]      



     [phone]          









 1  SEE RESULT BELOW



   Name:  ABNER URIRATE              : 1990    Attend Dr: Eloy Prather MD



   Acct:  [id number]  Unit: [id number]  AGE: 29            Location:  OR



   Re19                        SEX: F             Status:    ANNAMARIE CARRC



   -----------------------------------------------------------------------------
---------------



   



   SPEC: [id number]            KENNY: 19-          SUBM DR: Eloy Prather MD



   REQ:  [id number]             RECD: 



   STATUS: SOUT



   _



   ORDERED:  LEVEL 4



   



   FINAL DIAGNOSIS



   Ovary, right, excision:



   -- Adenofibroma.



   -----------------------------------------------------------------------------
---------------



   



   



   PRE-OPERATIVE DIAGNOSIS



   Endometriosis of ovary, pelvic, perineal pain



   



   



   GROSS DESCRIPTION



   The specimen is received in formalin labeled, Ovarian Mass-Right, and 
consists of a 2.4 x



   2.0 by up to 1.1 cm tan-pink irregular bosselated papilliferous rubbery soft 
tissue fragment



   which is serially sectioned and entirely submitted in two cassettes.



   



   Signed by and Reported on: __________              Maribel Cowart MD 
19 1556



   



   



   -----------------------------------------------------------------------------
---------------



   



   



   



   



   



   



   



   



   ** END OF REPORT **



   



   



   DEPARTMENT OF PATHOLOGY,  [address] [place] [address]



   Phone # [phone]      Fax #[phone]



   Devan Moody M.D. Director     [facility] # [id number]

 

 2  SEE RESULT BELOW



   Name:  ABNER URIARTE              : 1990    Attend Dr: Eloy Prather MD



   Acct:  [id number]  Unit: [id number]  AGE: 29            Location:  OR



   Re19                        SEX: F             Status:    DEP SD



   -----------------------------------------------------------------------------
---------------



   



   SPEC: [id number]            KENNY: 19-          SUBM DR: Eloy Prather MD



   REQ:  [id number]             RECD: 



   STATUS: SOUT



   _



   ORDERED:  LEVEL 4, IHC TECH, ADD, IHC TECH, 1ST



   



   



   ADDENDUM



   Addendum: This case is discussed with an reviewed after discussion with Dr. Prather regarding



   intraoperative findings and clinical history of progestational agent 
therapy.  The current



   sample demonstrates a polypoid growth pattern with dense fibrous stroma 
focal cyst formation



   with the polypoid elements and cyst lined primarily by low columnar 
epithelium demonstrating



   significant tubal metaplasia.  No features suggesting a serous neoplasm are 
seen.  In this



   context these changes likely represent maturation/tubal metaplasia of an 
endometriotic



   lesion in the context of processed gestational agent therapy.



   Immunohistochemical stains for calponin and desmin were performed with 
appropriate controls



   at [facility] and interpreted by [facility] pathology and are negative.



   Addendum Signed ______(signature on file)______ Devan Moody MD  1354



   -----------------------------------------------------------------------------
---------------



   



   



   FINAL DIAGNOSIS



   Ovary, right, excision:



   -- Adenofibroma.



   -----------------------------------------------------------------------------
---------------



   



   



   



   



   ** CONTINUED ON NEXT PAGE **



   



   DEPARTMENT OF PATHOLOGY,  [address] [place] [address]



   Phone # [phone]      Fax #[phone]



   Devan Moody M.D. Director     [facility] # [id number]



   



   



   



   



   



   



   PRE-OPERATIVE DIAGNOSIS



   Endometriosis of ovary, pelvic, perineal pain



   



   



   GROSS DESCRIPTION



   The specimen is received in formalin labeled, Ovarian Mass-Right, and 
consists of a 2.4 x



   2.0 by up to 1.1 cm tan-pink irregular bosselated papilliferous rubbery soft 
tissue fragment



   which is serially sectioned and entirely submitted in two cassettes.



   



   Signed by and Reported on: __________              Maribel Plocharczyk, MD 
19 1556



   



   



   -----------------------------------------------------------------------------
---------------



   



   



   



   



   



   



   



   



   



   



   



   



   



   



   



   



   



   



   



   



   



   



   



   



   



   



   



   



   



   ** END OF REPORT **



   



   



   DEPARTMENT OF PATHOLOGY,  [address] [place] [address]



   Phone # [phone]      Fax #[phone]



   Devan Moody M.D. Director     [facility] # [id number]







Procedures







 Date  Code  Description  Status

 

 2019  79959  Diagnostic Laparoscopy  Completed







[employer]







 Description

 

 No Information Available







Encounters







 Type  Date  Location  Provider  Dx  Diagnosis

 

 Office Visit  2020  [facility]  Eloy Prather,  N80.0  Endometriosis 
of



   8:20a    M.D.    uterus

 

 Office Visit  2019  [facility]  Eloy Prather,  Z01.818  Encounter 
for other



   3:20p    M.D.    preprocedural



           examination

 

 Office Visit  2019  [facility] Office  Eloy Prather,  N80.1  Endometriosis 
of ovary



   2:20p    M.D.    

 

 Office Visit  2019  [facility] Office  Eloy Prather,  N80.1  Endometriosis 
of ovary



   11:00a    M.D.    







Assessments







 Date  Code  Description  Provider

 

 2020  N80.0  Endometriosis of uterus  Eloy Prather M.D.

 

 2019  Z09  Encounter for follow-up examination after  Vicky Johnson CNM



     completed treatment for conditions other  



     than malignant neoplasm  

 

 2019  R10.2  Pelvic and perineal pain  Eloy Prather M.D.

 

 2019  N80.1  Endometriosis of ovary  Eloy Prather M.D.

 

 2019  Z01.818  Encounter for other preprocedural  Eloy Prather M.D.



     examination  

 

 2019  N80.1  Endometriosis of ovary  Eloy Prather M.D.

 

 2019  N80.1  Endometriosis of ovary  Eloy Prather M.D.







Plan of Treatment

2019 - Eloy Prather M.D.Z01.818 Encounter for other preprocedural 
examinationComments:DISCUSSED RISKS OF SURGERY FOR LAPAROSCOPY INCLUDING BUT 
NOT LIMITED TO INJURY TO BOWEL, BLADDER, URETERS, NERVES AND BLOOD VESSELS. 
DISCUSSED BLEEDING AND INFECTION RISKS AND POSSIBLE NEED FOR TRANSFUSION. PT 
UNDERSTANDS RISK OF WOUND COMPLICATION INCLUDING BREAKDOWN AND HERNIA. RISK OF 
DEEP VENOUS THROMBOSIS AND SEQUELAE. REASONS FOR SURGERY AND THE BENEFITS 
DERIVED. PT UNDERSTANDS THE ALTERNATIVESAND HAS HAD OPPORTUNITY TO ASK 
QUESTIONS WITH APPROPRIATE ANSWERS GIVEN.LENGTH OF TYPICAL HOSPITAL STAY AND 
FULL RECOVERY IF NO COMPLICATIONS OCCUR DISCUSSED.PREVENTATIVE MEASURES FOR 
CERTAIN COMPLICATIONS DISCUSSED.



Functional Status







 Description

 

 No Information Available







Mental Status







 Description

 

 No Information Available







Referrals







 Description

 

 No Information Available

## 2020-03-19 ENCOUNTER — HOSPITAL ENCOUNTER (EMERGENCY)
Dept: HOSPITAL 25 - UCEAST | Age: 30
Discharge: HOME | End: 2020-03-19
Payer: COMMERCIAL

## 2020-03-19 VITALS — SYSTOLIC BLOOD PRESSURE: 116 MMHG | DIASTOLIC BLOOD PRESSURE: 72 MMHG

## 2020-03-19 DIAGNOSIS — R05: Primary | ICD-10-CM

## 2020-03-19 DIAGNOSIS — Z87.891: ICD-10-CM

## 2020-03-19 DIAGNOSIS — R53.83: ICD-10-CM

## 2020-03-19 DIAGNOSIS — M79.10: ICD-10-CM

## 2020-03-19 DIAGNOSIS — R06.2: ICD-10-CM

## 2020-03-19 DIAGNOSIS — Z88.0: ICD-10-CM

## 2020-03-19 PROCEDURE — G0463 HOSPITAL OUTPT CLINIC VISIT: HCPCS

## 2020-03-19 PROCEDURE — U0002 COVID-19 LAB TEST NON-CDC: HCPCS

## 2020-03-19 PROCEDURE — 99212 OFFICE O/P EST SF 10 MIN: CPT

## 2020-03-19 NOTE — XMS REPORT
Continuity of Care Document (CCD)

 Created on:2020



Patient:Abner Uriarte

Sex:Female

:1990

External Reference #:[id number]





Demographics







 Address  [address]



   [place] [address]

 

 Home Phone  [phone]

 

 Mobile Phone  [phone]

 

 Email Address  [email or web address]

 

 Preferred Language  en

 

 Marital Status  Not  or 

 

 Adventist Affiliation  Unknown

 

 Race  White

 

 Ethnic Group  Not  or 









Author







 Name  Nurses (transmitted by agent of provider Ghislaine Llamas)









Problems







 Active Problems  Provider  Date

 

 Anxiety    Onset: 2016

 

 Endometriosis of ovary  Eloy Prather M.D.  Onset: 2019

 

 Endometriosis of fallopian tube  Vicky Johnson CNM  Onset: 2019







Social History







 Type  Date  Description  Comments

 

 Birth Sex    Unknown  

 

 Cigarette Use    Former Cigarette Smoker  

 

 ETOH Use    Occasionally consumes  approx 1-2 drinks



     alcohol  qyr

 

 Recreational Drug Use    Uses Marijuana Daily  

 

 Tobacco Use  Start: Unknown End:  Patient is a former  



   Unknown  smoker  

 

 Smoking Status  Reviewed: 20  Patient is a former  



     smoker  

 

 Exercise Type/Frequency    Exercises regularly  

 

 Seat Belt/Car Seat    Always uses seat belt  







Allergies, Adverse Reactions, Alerts







 Active Allergies  Reaction  Severity  Comments  Date

 

 Amoxicillin        2020









 Inactive Allergies









 NKDA        2019







Medications







 Active Medications  SIG  Qnty  Indications  Ordering  Date



         Provider  

 

 Orilissa  1 by mouth every day  90tabs    Kain Jensen  2020



          150mg        JR, DO  



 Tablets          



           

 

 Lupron Depot  one vial  1units  N80.0  Kain Jensen  2020



 (1-Month)  intramuscular every      JR, DO  



           3.75mg  month x 3        



 Kit          



           

 

 Ibuprofen  1 by mouth four times  30tabs  Z01.818  Eloy PALACIOS  2019



           600mg  a day 2days prior to      ZARIA Prather  



 Tablets  period and 1st 2 days        



   of period        









 History Medications









 Lupron Depot  one vial  1units  N80.0  Eloy PALACIOS  2020 -



 (1-Month)  intramuscular every      ROXANN PratherD.  2020



           3.75mg  month x 3        



 Kit          



           

 

 Amoxicillin  take one by mouth  21tabs    Vicky  2019 -



             500mg  every 8 hours (Issac Johnson CNM  2020



 Tablets  times/ day) for 1        



   week        







Medications Administered in Office







 Medication  SIG  Qnty  Indications  Ordering Provider  Date

 

 PT SCRN Tbco Id as Non User        Eloy Prather M.D.  2019



                   Injection          



           

 

 PT SCRN Tbco Id as Non User        Eloy Prather M.D.  2019



                   Injection          



           

 

 PT SCRN Tbco Id as Non User        Eloy Prather M.D.  2019



                   Injection          



           

 

 PT SCRN Tbco Id as Non User        Eloy Prather M.D.  2019



                   Injection          



           

 

 PT SCRN Tbco Id as Non User        Eloy Prather M.D.  2019



                   Injection          



           







Immunizations







 CPT Code  Status  Date  Vaccine  Lot #

 

 U-Td  Given  10/19/2010  Td(Adult),Unspecified  

 

 83399  Given  2008  Gardasil Vaccine For HPV  

 

   Given  10/01/2003  Influenza Vaccine Not Specified Administered Age 3  



       And Older  

 

 26031  Given  12/15/1999  Hepatitis B Vaccine Pediatric/Adolescent  

 

 42760  Given  1999  Hepatitis B Vaccine Pediatric/Adolescent  

 

 89969  Given  1999  Hepatitis B Vaccine Pediatric/Adolescent  

 

 U-Polio  Given  1996  Polio,Unspecified  

 

 U-DTaP  Given  1996  DTaP,Unspecified  

 

 02041  Given  1996  MMR Vaccine  

 

 U-DTaP  Given  1994  DTaP,Unspecified  

 

 U-Polio  Given  1994  Polio,Unspecified  

 

 43636  Given  1993  Hib Hboc Conjugate 4 Dose Schedule  

 

 14561  Given  1993  MMR Vaccine  

 

 U-DTaP  Given  1993  DTaP,Unspecified  

 

 74976  Given  1993  Hib Hboc Conjugate 4 Dose Schedule  

 

 U-Polio  Given  1992  Polio,Unspecified  

 

 U-DTaP  Given  1992  DTaP,Unspecified  

 

 49345  Given  1992  Hib Hboc Conjugate 4 Dose Schedule  

 

 U-Polio  Given  1992  Polio,Unspecified  

 

 U-DTaP  Given  1992  DTaP,Unspecified  

 

 70584  Given  1992  Hib Hboc Conjugate 4 Dose Schedule  







Vital Signs







 Date  Vital  Result  Comment

 

 2020 10:12am  BP Systolic  122 mmHg  









 BP Diastolic  74 mmHg  

 

 Height  62 inches  5'2"

 

 Weight  155.00 lb  

 

 BMI (Body Mass Index)  28.3 kg/m2  

 

 Last Menstrual Period  7243876  

 

   0  

 

 Parity  0  









 2020  8:11am  BP Systolic  116 mmHg  









 BP Diastolic  58 mmHg  

 

 Height  62 inches  5'2"

 

 Weight  157.00 lb  

 

 BMI (Body Mass Index)  28.7 kg/m2  

 

 Last Menstrual Period  6934908  

 

   0  

 

 Parity  0  







Results







 Test  Acquired Date  Facility  Test  Result  H/L  Range  Note

 

 Laboratory test  2019  [facility]  Surgical  SEE RESULT      1



 [place] [address]  Pathology  [place]      



     [phone]          

 

 Laboratory test  2019  [facility]  Surgical  SEE RESULT      2



 [place] [address]  Pathology  [place]      



     [phone]          









 1  SEE RESULT BELOW



   Name:  ABNER URIARTE              : 1990    Attend Dr: Eloy Prather MD



   Acct:  [id number]  Unit: [id number]  AGE: 29            Location:  OR



   Re19                        SEX: F             Status:    [facility]



   -----------------------------------------------------------------------------
---------------



   



   SPEC: [id number]            KENNY: 19-          [facility] DR: Eloy Prather MD



   REQ:  [id number]             RECD: 



   STATUS: SOUT



   _



   ORDERED:  LEVEL 4



   



   FINAL DIAGNOSIS



   Ovary, right, excision:



   -- Adenofibroma.



   -----------------------------------------------------------------------------
---------------



   



   



   PRE-OPERATIVE DIAGNOSIS



   Endometriosis of ovary, pelvic, perineal pain



   



   



   GROSS DESCRIPTION



   The specimen is received in formalin labeled, Ovarian Mass-Right, and 
consists of a 2.4 x



   2.0 by up to 1.1 cm tan-pink irregular bosselated papilliferous rubbery soft 
tissue fragment



   which is serially sectioned and entirely submitted in two cassettes.



   



   Signed by and Reported on: __________              Maribel Cowart MD 
19 1556



   



   



   -----------------------------------------------------------------------------
---------------



   



   



   



   



   



   



   



   



   ** END OF REPORT **



   



   



   DEPARTMENT OF PATHOLOGY,  [address] [place] [address]



   Phone # [phone]      Fax #[phone]



   Devan Moody M.D. Director     [facility] # [id number]

 

 2  SEE RESULT BELOW



   Name:  ABNER URIARTE YOLANDE              : 1990    Attend Dr: Eloy Prather MD



   Acct:  [id number]  Unit: [id number]  AGE: 29            Location:  OR



   Re19                        SEX: F             Status:    [facility]



   -----------------------------------------------------------------------------
---------------



   



   SPEC: [id number]            KENNY: 19-          [facility] DR: Eloy Prather MD



   REQ:  [id number]             RECD: 



   STATUS: SOUT



   _



   ORDERED:  LEVEL 4, IHC TECH, ADD, IHC TECH, 1ST



   



   



   ADDENDUM



   Addendum: This case is discussed with an reviewed after discussion with Dr. Prather regarding



   intraoperative findings and clinical history of progestational agent 
therapy.  The current



   sample demonstrates a polypoid growth pattern with dense fibrous stroma 
focal cyst formation



   with the polypoid elements and cyst lined primarily by low columnar 
epithelium demonstrating



   significant tubal metaplasia.  No features suggesting a serous neoplasm are 
seen.  In this



   context these changes likely represent maturation/tubal metaplasia of an 
endometriotic



   lesion in the context of processed gestational agent therapy.



   Immunohistochemical stains for calponin and desmin were performed with 
appropriate controls



   at [facility] and interpreted by [facility] pathology and are negative.



   Addendum Signed ______(signature on file)______ Devan Moody MD  1354



   -----------------------------------------------------------------------------
---------------



   



   



   FINAL DIAGNOSIS



   Ovary, right, excision:



   -- Adenofibroma.



   -----------------------------------------------------------------------------
---------------



   



   



   



   



   ** CONTINUED ON NEXT PAGE **



   



   DEPARTMENT OF PATHOLOGY,  [address] [place] [address]



   Phone # [phone]      Fax #[phone]



   Devan Moody M.D. Director     [facility] # [id number]



   



   



   



   



   



   



   PRE-OPERATIVE DIAGNOSIS



   Endometriosis of ovary, pelvic, perineal pain



   



   



   GROSS DESCRIPTION



   The specimen is received in formalin labeled, Ovarian Mass-Right, and 
consists of a 2.4 x



   2.0 by up to 1.1 cm tan-pink irregular bosselated papilliferous rubbery soft 
tissue fragment



   which is serially sectioned and entirely submitted in two cassettes.



   



   Signed by and Reported on: __________              Maribel Cowart MD 
19 1556



   



   



   -----------------------------------------------------------------------------
---------------



   



   



   



   



   



   



   



   



   



   



   



   



   



   



   



   



   



   



   



   



   



   



   



   



   



   



   



   



   



   ** END OF REPORT **



   



   



   DEPARTMENT OF PATHOLOGY,  [address] [place] [address]



   Phone # [phone]      Fax #[phone]



   Devan Moody M.D. Director     [facility] # [id number]







Procedures







 Date  Code  Description  Status

 

 2020  94728  Injection Intramuscular Or Subcutaneous  Completed

 

 2019  58188  Diagnostic Laparoscopy  Completed







Medical Devices







 Description

 

 No Information Available







Encounters







 Type  Date  Location  Provider  Dx  Diagnosis

 

 Office Visit  2020  [facility]  Kain Jensen JR,  R10.2  Pelvic and 
perineal



   10:15a    DO    pain

 

 Office Visit  2020  [facility]  Eloy Prather,  N80.0  Endometriosis 
of



   8:20a    M.D.    uterus

 

 Office Visit  2019  [facility]  Eloy Prather,  Z01.818  Encounter 
for other



   3:20p    M.D.    preprocedural



           examination

 

 Office Visit  2019  [facility]  Eloy Prather,  N80.1  Endometriosis 
of ovary



   2:20p    M.D.    







Assessments







 Date  Code  Description  Provider

 

 2020  N80.0  Endometriosis of uterus  Rosa Wall MD

 

 2020  N80.0  Endometriosis of uterus  Nurses

 

 2020  R10.2  Pelvic and perineal pain  Kain Jensen JR, DO

 

 2020  N80.0  Endometriosis of uterus  Eloy Prather M.D.

 

 2019  Z09  Encounter for follow-up examination after  Vicky Johnson CNM



     completed treatment for conditions other  



     than malignant neoplasm  

 

 2019  R10.2  Pelvic and perineal pain  Eloy Prather M.D.

 

 2019  N80.1  Endometriosis of ovary  Eloy Prather M.D.

 

 2019  Z01.818  Encounter for other preprocedural  Eloy Prather M.D.



     examination  

 

 2019  N80.1  Endometriosis of ovary  Eloy Prather M.D.







Plan of Treatment

2019 - Eloy Prather M.D.Z01.818 Encounter for other preprocedural 
examinationComments:DISCUSSED RISKS OF SURGERY FOR LAPAROSCOPY INCLUDING BUT 
NOT LIMITED TO INJURY TO BOWEL, BLADDER, URETERS, NERVES AND BLOOD VESSELS. 
DISCUSSED BLEEDING AND INFECTION RISKS AND POSSIBLE NEED FOR TRANSFUSION. PT 
UNDERSTANDS RISK OF WOUND COMPLICATION INCLUDING BREAKDOWN AND HERNIA. RISK OF 
DEEP VENOUS THROMBOSIS AND SEQUELAE. REASONS FOR SURGERY AND THE BENEFITS 
DERIVED. PT UNDERSTANDS THE ALTERNATIVESAND HAS HAD OPPORTUNITY TO ASK 
QUESTIONS WITH APPROPRIATE ANSWERS GIVEN.LENGTH OF TYPICAL HOSPITAL STAY AND 
FULL RECOVERY IF NO COMPLICATIONS OCCUR DISCUSSED.PREVENTATIVE MEASURES FOR 
CERTAIN COMPLICATIONS DISCUSSED.



Functional Status







 Description

 

 No Information Available







Mental Status







 Description

 

 No Information Available







Referrals







 Description

 

 No Information Available

## 2020-03-19 NOTE — UC
Respiratory Complaint HPI





- HPI Summary


HPI Summary: 





28 yo female presents with URI symptoms. She tells me that over the last 5 days 

she has had cough, feeling short of breath, and wheezing. Today developed body 

aches and feeling fatigued. She has not been taking anything OTC for her 

symptoms. She has been at home doing self isolation and has not had any 

positive COVID contacts. She does not smoke, but does vape. She denies sore 

throat, sinus symptoms, chest pain, abdominal pain, n/v. 





- History of Current Complaint


Stated Complaint: BREATHING ISSUES


Time Seen by Provider: 03/19/20 19:32


Hx Obtained From: Patient


Hx Last Menstrual Period: 010620


Onset/Duration: Gradual Onset


Severity Initially: Moderate


Severity Currently: Moderate


Pain Intensity: 5


Pain Scale Used: 0-10 Numeric





- Allergies/Home Medications


Allergies/Adverse Reactions: 


 Allergies











Allergy/AdvReac Type Severity Reaction Status Date / Time


 


amoxicillin Allergy  GI Upset Verified 03/19/20 19:44











Home Medications: 


 Home Medications





Ibuprofen TAB* [Motrin TAB* 600 MG] 600 mg PO Q6H PRN 12/04/19 [History 

Confirmed 03/19/20]


Albuterol HFA INHALER* [Ventolin HFA Inhaler*] 2 puff INH Q6H PRN #1 mdi 03/19/ 20 [Rx]


Leuprolide 11.25 MG KIT [Lupron Depot*] 11.25 mg IM ONCE 03/19/20 [History 

Confirmed 03/19/20]











PMH/Surg Hx/FS Hx/Imm Hx





- Additional Past Medical History


Additional PMH: 





Endometriosis








- Surgical History


Surgical History: Yes


Surgery Procedure, Year, and Place: wisdom teeth





- Family History


Known Family History: Positive: Hypertension, Other


Family History: breast cancer, lung cancer





- Social History


Lives: With Family


Alcohol Use: Rare


Substance Use Type: Marijuana


Substance Use Comment - Amount & Last Used: regularly, for pain


Smoking Status (MU): Former Smoker


Amount Used/How Often: 3/4 ppd- vapes now


Length of Time of Smoking/Using Tobacco: 10 years


Have You Smoked in the Last Year: Yes


When Did the Patient Quit Smoking/Using Tobacco: 2 years ago


Household Exposure Type: Cigarettes





Review of Systems


All Other Systems Reviewed And Are Negative: No


Constitutional: Positive: Other - Body aches


Skin: Positive: Negative


Eyes: Positive: Negative


ENT: Positive: Negative


Respiratory: Positive: Shortness Of Breath, Cough


Cardiovascular: Positive: Negative


Gastrointestinal: Positive: Negative


Neurological/Mental Status: Positive: Negative


Psychological: Positive: Negative





Physical Exam





- Summary


Physical Exam Summary: 





GENERAL: NAD. WDWN. No pain distress.


SKIN: No rashes, sores, lesions, or open wounds.


HEENT:


            Head: AT/NC


            Eyes: Conjunctiva clear without inflammation or discharge.


            Ears: Hearing grossly normal. TMs intact, no bulging, erythema, or 

edema. 


            Nose: Nasal mucosa pink and moist. NTTP maxillary and frontal 

sinus. 


            Throat: Posterior oropharynx without exudates, erythema, or 

tonsillar enlargement.  Uvula midline.


NECK: Supple. Nontender. No lymphadenopathy. 


CHEST: Mild wheezing throughout. No r/r. No accessory muscle use. Breathing 

comfortably and in no distress.


CV:  RRR. Pulses intact. Cap refill <2seconds


NEURO: Alert. 


PSYCH: Age appropriate behavior.


Triage Information Reviewed: Yes


Vital Signs: 





Vital Signs:











Temp Pulse Resp BP Pulse Ox


 


 98.2 F   94   16   116/72   99 


 


 03/19/20 19:26  03/19/20 19:26  03/19/20 19:26  03/19/20 19:26  03/19/20 19:26








 Laboratory Tests











  03/19/20





  19:50


 


Influenza A (Rapid)  Negative


 


Influenza B (Rapid)  Negative











Vital Signs Reviewed: Yes





Respiratory Course/Dx





- Course


Course Of Treatment: 


POC flu negative.


Given wheezing - rx for albuterol inhaler. 





Exam performed utilizing CDC recommended PPE. 





You are being tested for COVID-19. You need to quarantine yourself in a bedroom 

and bathroom only you are using. You may not leave the house. 


[facility] will contact you and notify of results when they are available.


Advised to be on home isolation until cleared by the health department.


Go to ED for increased SOB or any difficulty breathing - new or worsening 

symptoms.








- Differential Dx/Diagnosis


Provider Diagnosis: 


 Cough








Discharge ED





- Sign-Out/Discharge


Documenting (check all that apply): Patient Departure


All imaging exams completed and their final reports reviewed: No Studies





- Discharge Plan


Condition: Stable


Disposition: HOME


Prescriptions: 


Albuterol HFA INHALER* [Ventolin HFA Inhaler*] 2 puff INH Q6H PRN #1 mdi


 PRN Reason: Sob/Wheezing


Patient Education Materials:  Viral Syndrome (ED)


Forms:  COVID-19 Tested & Isolation


Referrals: 


No Primary Care Phys,NOPCP [Primary Care Provider] - 


Additional Instructions: 





You are being tested for COVID-19. You need to quarantine yourself in a bedroom 

and bathroom only you are using. You may not leave the house. 


[facility] will contact you and notify of results when they are available.


Advised to be on home isolation until cleared by the health department.


Go to ED for increased SOB or any difficulty breathing - new or worsening 

symptoms.








- Billing Disposition and Condition


Condition: STABLE


Disposition: Home





- Attestation Statements


Provider Attestation: 





This patient was not seen by me.


I was available for consult.


Chart reviewed.


ASMITA

## 2020-03-19 NOTE — XMS REPORT
Continuity of Care Document (CCD)

 Created on:2020



Patient:Vero Uriarte

Sex:Female

:1990

External Reference #:[id number]





Demographics







 Address  [address]



   [place] [address]

 

 Home Phone  [phone]

 

 Mobile Phone  [phone]

 

 Email Address  [email or web address]

 

 Preferred Language  en

 

 Marital Status  Not  or 

 

 Synagogue Affiliation  Unknown

 

 Race  White

 

 Ethnic Group  Not  or 









Author







 Name  Nurses (transmitted by agent of provider Nilsa Enriquez)









Problems







 Active Problems  Provider  Date

 

 Anxiety    Onset: 2016

 

 Endometriosis of ovary  Eloy Prather M.D.  Onset: 2019

 

 Endometriosis of fallopian tube  Vicky Johnson CNM  Onset: 2019







Social History







 Type  Date  Description  Comments

 

 Birth Sex    Unknown  

 

 Cigarette Use    Former Cigarette Smoker  

 

 ETOH Use    Occasionally consumes  approx 1-2 drinks



     alcohol  qyr

 

 Recreational Drug Use    Uses Marijuana Daily  

 

 Tobacco Use  Start: Unknown End:  Patient is a former  



   Unknown  smoker  

 

 Smoking Status  Reviewed: 20  Patient is a former  



     smoker  

 

 Exercise Type/Frequency    Exercises regularly  

 

 Seat Belt/Car Seat    Always uses seat belt  







Allergies, Adverse Reactions, Alerts







 Active Allergies  Reaction  Severity  Comments  Date

 

 Amoxicillin        2020









 Inactive Allergies









 NKDA        2019







Medications







 Active Medications  SIG  Qnty  Indications  Ordering  Date



         Provider  

 

 Oriliellena  1 by mouth every day  90tabs    Kain Jensen  2020



          150mg        JR, DO  



 Tablets          



           

 

 Lupron Depot  one vial  1units  N80.0  Kain Jensen  2020



 (1-Month)  intramuscular every      JR, DO  



           3.75mg  month x 3        



 Kit          



           

 

 Ibuprofen  1 by mouth four times  30tabs  Z01.818  Eloy PALACIOS  2019



           600mg  a day 2days prior to      ZARIA Prather  



 Tablets  period and 1st 2 days        



   of period        









 History Medications









 Lupron Depot  one vial  1units  N80.0  Eloy PALACIOS  2020 -



 (1-Month)  intramuscular every      Gelluis MJulissaD.  2020



           3.75mg  month x 3        



 Kit          



           

 

 Amoxicillin  take one by mouth  21tabs    Vicky  2019 -



             500mg  every 8 hours (Issca Johnson CNM  2020



 Tablets  times/ day) for 1        



   week        







Medications Administered in Office







 Medication  SIG  Qnty  Indications  Ordering Provider  Date

 

 PT SCRN Tbco Id as Non User        Eloy Prather M.D.  2019



                   Injection          



           

 

 PT SCRN Tbco Id as Non User        Eloy Prather M.D.  2019



                   Injection          



           

 

 PT SCRN Tbco Id as Non User        Eloy Prather M.D.  2019



                   Injection          



           

 

 PT SCRN Tbco Id as Non User        Eloy Prather M.D.  2019



                   Injection          



           

 

 PT SCRN Tbco Id as Non User        Eloy Prather M.D.  2019



                   Injection          



           







Immunizations







 CPT Code  Status  Date  Vaccine  Lot #

 

 U-Td  Given  10/19/2010  Td(Adult),Unspecified  

 

 85160  Given  2008  Gardasil Vaccine For HPV  

 

   Given  10/01/2003  Influenza Vaccine Not Specified Administered Age 3  



       And Older  

 

 91367  Given  12/15/1999  Hepatitis B Vaccine Pediatric/Adolescent  

 

 05268  Given  1999  Hepatitis B Vaccine Pediatric/Adolescent  

 

 16331  Given  1999  Hepatitis B Vaccine Pediatric/Adolescent  

 

 U-Polio  Given  1996  Polio,Unspecified  

 

 U-DTaP  Given  1996  DTaP,Unspecified  

 

 13778  Given  1996  MMR Vaccine  

 

 U-DTaP  Given  1994  DTaP,Unspecified  

 

 U-Polio  Given  1994  Polio,Unspecified  

 

 01264  Given  1993  Hib Hboc Conjugate 4 Dose Schedule  

 

 26830  Given  1993  MMR Vaccine  

 

 U-DTaP  Given  1993  DTaP,Unspecified  

 

 21539  Given  1993  Hib Hboc Conjugate 4 Dose Schedule  

 

 U-Polio  Given  1992  Polio,Unspecified  

 

 U-DTaP  Given  1992  DTaP,Unspecified  

 

 61238  Given  1992  Hib Hboc Conjugate 4 Dose Schedule  

 

 U-Polio  Given  1992  Polio,Unspecified  

 

 U-DTaP  Given  1992  DTaP,Unspecified  

 

 62356  Given  1992  Hib Hboc Conjugate 4 Dose Schedule  







Vital Signs







 Date  Vital  Result  Comment

 

 2020 10:12am  BP Systolic  122 mmHg  









 BP Diastolic  74 mmHg  

 

 Height  62 inches  5'2"

 

 Weight  155.00 lb  

 

 BMI (Body Mass Index)  28.3 kg/m2  

 

 Last Menstrual Period  1343322  

 

   0  

 

 Parity  0  









 2020  8:11am  BP Systolic  116 mmHg  









 BP Diastolic  58 mmHg  

 

 Height  62 inches  5'2"

 

 Weight  157.00 lb  

 

 BMI (Body Mass Index)  28.7 kg/m2  

 

 Last Menstrual Period  5111111  

 

   0  

 

 Parity  0  







Results







 Test  Acquired Date  Facility  Test  Result  H/L  Range  Note

 

 Laboratory test  2019  [facility]  Surgical  SEE RESULT      1



 [place] [address]  Pathology  [place]      



     [phone]          

 

 Laboratory test  2019  [facility]  Surgical  SEE RESULT      2



 [place] [address]  Pathology  [place]      



     [phone]          









 1  SEE RESULT BELOW



   Name:  VERO URIARTE              : 1990    Attend Dr: Eloy Prather MD



   Acct:  [id number]  Unit: [id number]  AGE: 29            Location:  OR



   Re19                        SEX: F             Status:    [facility]



   -----------------------------------------------------------------------------
---------------



   



   SPEC: [id number]            KENNY: 19-          [facility] DR: Eloy Prather MD



   REQ:  [id number]             RECD: 



   STATUS: SOUT



   _



   ORDERED:  LEVEL 4



   



   FINAL DIAGNOSIS



   Ovary, right, excision:



   -- Adenofibroma.



   -----------------------------------------------------------------------------
---------------



   



   



   PRE-OPERATIVE DIAGNOSIS



   Endometriosis of ovary, pelvic, perineal pain



   



   



   GROSS DESCRIPTION



   The specimen is received in formalin labeled, Ovarian Mass-Right, and 
consists of a 2.4 x



   2.0 by up to 1.1 cm tan-pink irregular bosselated papilliferous rubbery soft 
tissue fragment



   which is serially sectioned and entirely submitted in two cassettes.



   



   Signed by and Reported on: __________              Maribel Cowart MD 
19 1556



   



   



   -----------------------------------------------------------------------------
---------------



   



   



   



   



   



   



   



   



   ** END OF REPORT **



   



   



   DEPARTMENT OF PATHOLOGY,  [address] [place] [address]



   Phone # [phone]      Fax #[phone]



   Devan Moody M.D. Director     [facility] # [id number]

 

 2  SEE RESULT BELOW



   Name:  VERO URIARTE YOLANDE              : 1990    Attend Dr: Eloy Prather MD



   Acct:  [id number]  Unit: [id number]  AGE: 29            Location:  OR



   Re19                        SEX: F             Status:    [facility]



   -----------------------------------------------------------------------------
---------------



   



   SPEC: [id number]            KENNY: 19-          [facility] DR: Eloy Prather MD



   REQ:  [id number]             RECD: 



   STATUS: SOUT



   _



   ORDERED:  LEVEL 4, IHC TECH, ADD, IHC TECH, 1ST



   



   



   ADDENDUM



   Addendum: This case is discussed with an reviewed after discussion with Dr. Prather regarding



   intraoperative findings and clinical history of progestational agent 
therapy.  The current



   sample demonstrates a polypoid growth pattern with dense fibrous stroma 
focal cyst formation



   with the polypoid elements and cyst lined primarily by low columnar 
epithelium demonstrating



   significant tubal metaplasia.  No features suggesting a serous neoplasm are 
seen.  In this



   context these changes likely represent maturation/tubal metaplasia of an 
endometriotic



   lesion in the context of processed gestational agent therapy.



   Immunohistochemical stains for calponin and desmin were performed with 
appropriate controls



   at [facility] and interpreted by [facility] pathology and are negative.



   Addendum Signed ______(signature on file)______ Devan Moody MD  1354



   -----------------------------------------------------------------------------
---------------



   



   



   FINAL DIAGNOSIS



   Ovary, right, excision:



   -- Adenofibroma.



   -----------------------------------------------------------------------------
---------------



   



   



   



   



   ** CONTINUED ON NEXT PAGE **



   



   DEPARTMENT OF PATHOLOGY,  [address] [place] [address]



   Phone # [phone]      Fax #[phone]



   Devan Moody M.D. Director     [facility] # [id number]



   



   



   



   



   



   



   PRE-OPERATIVE DIAGNOSIS



   Endometriosis of ovary, pelvic, perineal pain



   



   



   GROSS DESCRIPTION



   The specimen is received in formalin labeled, Ovarian Mass-Right, and 
consists of a 2.4 x



   2.0 by up to 1.1 cm tan-pink irregular bosselated papilliferous rubbery soft 
tissue fragment



   which is serially sectioned and entirely submitted in two cassettes.



   



   Signed by and Reported on: __________              Maribel Cowart MD 
19 1556



   



   



   -----------------------------------------------------------------------------
---------------



   



   



   



   



   



   



   



   



   



   



   



   



   



   



   



   



   



   



   



   



   



   



   



   



   



   



   



   



   



   ** END OF REPORT **



   



   



   DEPARTMENT OF PATHOLOGY,  [address] [place] [address]



   Phone # [phone]      Fax #[phone]



   Devan Moody M.D. Director     [facility] # [id number]







Procedures







 Date  Code  Description  Status

 

 2020  02911  Injection Intramuscular Or Subcutaneous  Completed

 

 2020  79802  Injection Intramuscular Or Subcutaneous  Completed

 

 2019  16227  Diagnostic Laparoscopy  Completed







Medical Devices







 Description

 

 No Information Available







Encounters







 Type  Date  Location  Provider  Dx  Diagnosis

 

 Office Visit  2020  [facility]  Kain Jensen JR,  R10.2  Pelvic and 
perineal



   10:15a    DO    pain

 

 Office Visit  2020  [facility]  Eloy Prather,  N80.0  Endometriosis 
of



   8:20a    M.D.    uterus

 

 Office Visit  2019  [facility]  Eloy Prather,  Z01.818  Encounter 
for other



   3:20p    M.D.    preprocedural



           examination

 

 Office Visit  2019  [facility]  Eloy Prather,  N80.1  Endometriosis 
of ovary



   2:20p    M.D.    







Assessments







 Date  Code  Description  Provider

 

 2020  N80.0  Endometriosis of uterus  Christal Vieyra MD

 

 2020  N80.0  Endometriosis of uterus  Nurses

 

 2020  N80.0  Endometriosis of uterus  Rosa Wall MD

 

 2020  N80.0  Endometriosis of uterus  Nurses

 

 2020  R10.2  Pelvic and perineal pain  Kain Jensen JR, DO

 

 2020  N80.0  Endometriosis of uterus  Eloy Prather M.D.

 

 2019  Z09  Encounter for follow-up examination after  Vicky Johnson CNM



     completed treatment for conditions other  



     than malignant neoplasm  

 

 2019  R10.2  Pelvic and perineal pain  Eloy Prather M.D.

 

 2019  N80.1  Endometriosis of ovary  Eloy Prather M.D.

 

 2019  Z01.818  Encounter for other preprocedural  Eloy Prather M.D.



     examination  

 

 2019  N80.1  Endometriosis of ovary  Eloy Prather M.D.







Plan of Treatment

2019 - Eloy Prather M.D.Z01.818 Encounter for other preprocedural 
examinationComments:DISCUSSED RISKS OF SURGERY FOR LAPAROSCOPY INCLUDING BUT 
NOT LIMITED TO INJURY TO BOWEL, BLADDER, URETERS, NERVES AND BLOOD VESSELS. 
DISCUSSED BLEEDING AND INFECTION RISKS AND POSSIBLE NEED FOR TRANSFUSION. PT 
UNDERSTANDS RISK OF WOUND COMPLICATION INCLUDING BREAKDOWN AND HERNIA. RISK OF 
DEEP VENOUS THROMBOSIS AND SEQUELAE. REASONS FOR SURGERY AND THE BENEFITS 
DERIVED. PT UNDERSTANDS THE ALTERNATIVESAND HAS HAD OPPORTUNITY TO ASK 
QUESTIONS WITH APPROPRIATE ANSWERS GIVEN.LENGTH OF TYPICAL HOSPITAL STAY AND 
FULL RECOVERY IF NO COMPLICATIONS OCCUR DISCUSSED.PREVENTATIVE MEASURES FOR 
CERTAIN COMPLICATIONS DISCUSSED.



Functional Status







 Description

 

 No Information Available







Mental Status







 Description

 

 No Information Available







Referrals







 Description

 

 No Information Available

## 2020-03-23 ENCOUNTER — HOSPITAL ENCOUNTER (EMERGENCY)
Dept: HOSPITAL 25 - ED | Age: 30
Discharge: HOME | End: 2020-03-23
Payer: COMMERCIAL

## 2020-03-23 VITALS — SYSTOLIC BLOOD PRESSURE: 127 MMHG | DIASTOLIC BLOOD PRESSURE: 73 MMHG

## 2020-03-23 DIAGNOSIS — Z88.0: ICD-10-CM

## 2020-03-23 DIAGNOSIS — J06.9: Primary | ICD-10-CM

## 2020-03-23 DIAGNOSIS — F32.9: ICD-10-CM

## 2020-03-23 DIAGNOSIS — Z79.899: ICD-10-CM

## 2020-03-23 DIAGNOSIS — J45.909: ICD-10-CM

## 2020-03-23 DIAGNOSIS — F41.9: ICD-10-CM

## 2020-03-23 DIAGNOSIS — Z87.891: ICD-10-CM

## 2020-03-23 LAB
ALBUMIN SERPL BCG-MCNC: 4.6 G/DL (ref 3.2–5.2)
ALBUMIN/GLOB SERPL: 2 {RATIO} (ref 1–3)
ALP SERPL-CCNC: 60 U/L (ref 34–104)
ALT SERPL W P-5'-P-CCNC: 11 U/L (ref 7–52)
ANION GAP SERPL CALC-SCNC: 7 MMOL/L (ref 2–11)
AST SERPL-CCNC: 14 U/L (ref 13–39)
BASOPHILS # BLD AUTO: 0 10^3/UL (ref 0–0.2)
BUN SERPL-MCNC: 10 MG/DL (ref 6–24)
BUN/CREAT SERPL: 14.3 (ref 8–20)
CALCIUM SERPL-MCNC: 9.7 MG/DL (ref 8.6–10.3)
CHLORIDE SERPL-SCNC: 104 MMOL/L (ref 101–111)
EOSINOPHIL # BLD AUTO: 0 10^3/UL (ref 0–0.6)
GLOBULIN SER CALC-MCNC: 2.3 G/DL (ref 2–4)
GLUCOSE SERPL-MCNC: 106 MG/DL (ref 70–100)
HCG SERPL QL: < 0.6 MIU/ML
HCO3 SERPL-SCNC: 27 MMOL/L (ref 22–32)
HCT VFR BLD AUTO: 44 % (ref 35–47)
HGB BLD-MCNC: 15.1 G/DL (ref 12–16)
LYMPHOCYTES # BLD AUTO: 2.1 10^3/UL (ref 1–4.8)
MCH RBC QN AUTO: 29 PG (ref 27–31)
MCHC RBC AUTO-ENTMCNC: 35 G/DL (ref 31–36)
MCV RBC AUTO: 83 FL (ref 80–97)
MONOCYTES # BLD AUTO: 0.3 10^3/UL (ref 0–0.8)
NEUTROPHILS # BLD AUTO: 3.9 10^3/UL (ref 1.5–7.7)
NRBC # BLD AUTO: 0 10^3/UL
NRBC BLD QL AUTO: 0
PLATELET # BLD AUTO: 240 10^3/UL (ref 150–450)
POTASSIUM SERPL-SCNC: 3.5 MMOL/L (ref 3.5–5)
PROT SERPL-MCNC: 6.9 G/DL (ref 6.4–8.9)
RBC # BLD AUTO: 5.24 10^6 /UL (ref 3.7–4.87)
SODIUM SERPL-SCNC: 138 MMOL/L (ref 135–145)
WBC # BLD AUTO: 6.4 10^3/UL (ref 3.5–10.8)

## 2020-03-23 PROCEDURE — 85025 COMPLETE CBC W/AUTO DIFF WBC: CPT

## 2020-03-23 PROCEDURE — 71046 X-RAY EXAM CHEST 2 VIEWS: CPT

## 2020-03-23 PROCEDURE — 99283 EMERGENCY DEPT VISIT LOW MDM: CPT

## 2020-03-23 PROCEDURE — 80053 COMPREHEN METABOLIC PANEL: CPT

## 2020-03-23 PROCEDURE — 84702 CHORIONIC GONADOTROPIN TEST: CPT

## 2020-03-23 PROCEDURE — 36415 COLL VENOUS BLD VENIPUNCTURE: CPT

## 2020-03-23 NOTE — ED
Complex/Multi-Sys Presentation





- HPI Summary


HPI Summary: 





Patient is a 30 y/o F presenting to the ED for a chief complaint of shortness 

of breath that began a few days ago. Currently, patient also reports fatigue, 

lightheadedness, decreased concentration, and decreased appetite. She also 

notes an episode of bilateral hand and feet numbness and paresthesia. A few 

days ago, patient had a fever, which she states has been intermittent. No 

aggravating or alleviating factors are reported. She was tested for COVID-19 at 

[facility] with negative findings. She was sent to [facility] by the 

[facility] as patient's symptoms have persisted since initial 

onset. PMHx is significant for insulin-resistant PCOS and endometriosis for 

which she had surgery a few months ago. 








- History Of Current Complaint


Chief Complaint: EDShortnessOfBreath


Hx Obtained From: Patient


Onset/Duration: Sudden Onset, Lasting Days, Still Present


Timing: Constant


Severity Currently: Moderate


Severity Initially: Moderate


Aggravating Factor(s): Nothing


Alleviating Factor(s): Nothing


Associated Signs And Symptoms: Positive: SOB, Fever - In vitals, 98.3 F


Related History: Recent Illness





- Allergies/Home Medications


Allergies/Adverse Reactions: 


 Allergies











Allergy/AdvReac Type Severity Reaction Status Date / Time


 


amoxicillin Allergy  GI Upset Verified 03/19/20 19:44











Home Medications: 


 Home Medications





Albuterol HFA INHALER* [Ventolin HFA Inhaler*] 2 puff INH Q6H PRN #1 mdi 03/19/ 20 [Rx Confirmed 03/23/20]


Leuprolide 11.25 MG KIT [Lupron Depot*] 11.25 mg IM MONTHLY 03/19/20 [History 

Confirmed 03/23/20]


Multivitamins/Minerals TAB* [Theragran/minerals TAB*] 1 tab PO DAILY 03/23/20 [

History Confirmed 03/23/20]











PMH/Surg Hx/FS Hx/Imm Hx


Previously Healthy: Yes


Endocrine/Hematology History: 


   Denies: Hx Diabetes - possible borderline, Hx Thyroid Disease


Cardiovascular History: 


   Denies: Hx Congestive Heart Failure, Hx Hypertension


Respiratory History: Reports: Hx Asthma - On meds, Other Respiratory Problems/

Disorders - smoker vapes


   Denies: Hx Chronic Obstructive Pulmonary Disease (COPD)


GI History: 


   Denies: Hx Ulcer


 History: Reports: Hx Kidney Infection - 1 year ago- none recently


   Denies: Hx Dialysis, Hx Kidney Stones, Hx Renal Disease


Sensory History: 


   Denies: Hx Contacts or Glasses, Hx Legally Blind, Hx Deafness, Hx Hearing Aid


Opthamlomology History: 


   Denies: Hx Contacts or Glasses, Hx Legally Blind


EENT History: 


   Denies: Hx Deafness


Neurological History: Reports: Hx Migraine - occassional


Psychiatric History: Reports: Hx Anxiety, Hx Depression





- Cancer History


Hx Chemotherapy: No





- Surgical History


Surgical History: Yes


Surgery Procedure, Year, and Place: wisdom teeth, endometriosis surgery


Hx Anesthesia Reactions: No





- Immunization History


Date of Tetanus Vaccine: UTD


Date of Influenza Vaccine: NONE


Infectious Disease History: No


Infectious Disease History: 


   Denies: Hx Clostridium Difficile, Hx Hepatitis, Hx Human Immunodeficiency 

Virus (HIV), Hx of Known/Suspected MRSA, Hx Shingles, Hx Tuberculosis





- Family History


Known Family History: Positive: Hypertension, Other


Family History: breast cancer, lung cancer





- Social History


Occupation: Employed Full-time


Alcohol Use: Rare


Hx Substance Use: Yes


Substance Use Type: Reports: Marijuana


Substance Use Comment - Amount & Last Used: regularly, for pain


Hx Tobacco Use: Yes


Smoking Status (MU): Former Smoker


Amount Used/How Often: 3/4 ppd- vapes now


Length of Time of Smoking/Using Tobacco: 10 years


Have You Smoked in the Last Year: Yes





Review of Systems


Positive: Fever - In vitals, 98.3 F, Fatigue, Other - Positive decreased 

appetite


Positive: Shortness Of Breath


Neurological/Mental Status: Other - Positive lightheadedness


Positive: Paresthesia - Bilateral hands and feet, Numbness - Bilateral hands 

and feet


Psychological: Other - Positive decreased concentration


All Other Systems Reviewed And Are Negative: Yes





Physical Exam





- Summary


Physical Exam Summary: 





Constitutional: Well-developed, Well-nourished, Alert. (-) Distressed


Skin: Warm, Dry


HENT: Normocephalic; Atraumatic


Eyes: Conjunctiva normal


Neck: Musculoskeletal ROM normal neck. (-) JVD, (-) Stridor, (-) Nuchal rigidity


Cardio: Rhythm regular, rate normal, Heart sounds normal; Intact distal pulses; 

Radial pulses are 2+ and symmetric. (-) Murmur


Pulmonary/Chest wall: Effort normal. (-) Respiratory distress, (-) Wheezes, (-) 

Rales


Abd: Soft, (-) tenderness, (-) Distension, (-) Guarding, (-) Rebound


Musculoskeletal: (-) Edema


Lymph: (-) Cervical adenopathy


Neuro: Alert, Oriented x3


Psych: Mood and affect Normal


Triage Information Reviewed: Yes


Vital Signs Reviewed: Yes





Procedures





- Sedation


Patient Received Moderate/Deep Sedation with Procedure: No





Diagnostics





- Laboratory


Result Diagrams: 


 03/23/20 12:40





 03/23/20 12:40


Lab Statement: Any lab studies that have been ordered have been reviewed, and 

results considered in the medical decision making process.





- Radiology


  ** Chest X-ray


Radiology Interpretation Completed By: Radiologist


Summary of Radiographic Findings: Chest X-ray IMPRESSION: NO ACTIVE 

CARDIOPULMONARY DISEASE.  Reviewed by Dr. Rubio.





Re-Evaluation





- Re-Evaluation


  ** Second Eval


Re-Evaluation Time: 13:45


Change: Improved - d/w patient results return precautions





Complex Multi-Symp Course/Dx


Course Of Treatment: 30 y/o F p/w SOB, cough and fatigue.  - VSS NAD. Lungs 

CTAB. CXR negative for PNA- 2+ radial pulses, hands well perfused.  Patient 

presenting with flu like illness. VS here stable, well appearing. Tolerating 

PO. Lungs CTAB, easy WOB.. No neck pain/nuchal rigidity, headache or other 

signs of meningismus.  Encouraged to take motrin/tylenol PRN, increased PO 

intake of fluids and return for worsening symptoms.





- Diagnoses


Provider Diagnoses: 


 Shortness of breath, URI (upper respiratory infection)








Discharge ED





- Sign-Out/Discharge


Documenting (check all that apply): Patient Departure - Discharge





- Discharge Plan


Condition: Stable


Disposition: HOME


Patient Education Materials:  Upper Respiratory Infection (ED)


Referrals: 


[facility] [Outside]


Additional Instructions: 


You were seen in the emergency department for cough and shortness of breath. 

Your x-ray did not show pneumonia. Your lab work was unremarkable. Please drink 

lots of fluids while you're home.  You can take Motrin or Tylenol for pain or 

fever.


If any studies were not completed at the time of discharge you will be called 

with the relevant results.


Please follow up with your primary care doctor in next 2-3 days and return to 

emergency department for chest pain, trouble breathing, worsening or concerning 

symptoms.


It was a pleasure taking care of you today.





- Billing Disposition and Condition


Condition: STABLE


Disposition: Home





- Attestation Statements


Document Initiated by Scribe: Yes


Documenting Scribe: Mary Rehman


Provider For Whom Scribe is Documenting (Include Credential): Do Rubio MD


Scribe Attestation: 


IMary, scribed for Do Rubio MD on 03/23/20 at 1353. 


Scribe Documentation Reviewed: Yes


Provider Attestation: 


The documentation as recorded by the [email or web address] accurately reflects 

the service I personally performed and the decisions made by me, Do Rubio MD


Status of Scribe Document: Viewed